# Patient Record
Sex: MALE | URBAN - METROPOLITAN AREA
[De-identification: names, ages, dates, MRNs, and addresses within clinical notes are randomized per-mention and may not be internally consistent; named-entity substitution may affect disease eponyms.]

---

## 2018-06-07 ENCOUNTER — HOSPITAL ENCOUNTER (OUTPATIENT)
Age: 78
Discharge: HOME | End: 2018-06-09

## 2018-06-07 LAB — PROCALCITONIN: 0.51 NG/ML (ref 0–0.08)

## 2018-06-07 PROCEDURE — 84145 PROCALCITONIN (PCT): CPT

## 2023-08-23 LAB
ALANINE AMINOTRANSFERASE (SGPT) (U/L) IN SER/PLAS: 17 U/L (ref 10–52)
ALBUMIN (G/DL) IN SER/PLAS: 4.1 G/DL (ref 3.4–5)
ALKALINE PHOSPHATASE (U/L) IN SER/PLAS: 89 U/L (ref 33–136)
ANION GAP IN SER/PLAS: 11 MMOL/L (ref 10–20)
ASPARTATE AMINOTRANSFERASE (SGOT) (U/L) IN SER/PLAS: 17 U/L (ref 9–39)
BILIRUBIN TOTAL (MG/DL) IN SER/PLAS: 0.4 MG/DL (ref 0–1.2)
CALCIUM (MG/DL) IN SER/PLAS: 9.6 MG/DL (ref 8.6–10.3)
CARBON DIOXIDE, TOTAL (MMOL/L) IN SER/PLAS: 33 MMOL/L (ref 21–32)
CHLORIDE (MMOL/L) IN SER/PLAS: 97 MMOL/L (ref 98–107)
CHOLESTEROL (MG/DL) IN SER/PLAS: 175 MG/DL (ref 0–199)
CHOLESTEROL IN HDL (MG/DL) IN SER/PLAS: 36.2 MG/DL
CHOLESTEROL/HDL RATIO: 4.8
CREATININE (MG/DL) IN SER/PLAS: 1.22 MG/DL (ref 0.5–1.3)
GFR MALE: 59 ML/MIN/1.73M2
GLUCOSE (MG/DL) IN SER/PLAS: 125 MG/DL (ref 74–99)
LDL: 81 MG/DL (ref 0–99)
NON HDL CHOLESTEROL: 139 MG/DL
POTASSIUM (MMOL/L) IN SER/PLAS: 5.2 MMOL/L (ref 3.5–5.3)
PROTEIN TOTAL: 7 G/DL (ref 6.4–8.2)
SODIUM (MMOL/L) IN SER/PLAS: 136 MMOL/L (ref 136–145)
TRIGLYCERIDE (MG/DL) IN SER/PLAS: 290 MG/DL (ref 0–149)
UREA NITROGEN (MG/DL) IN SER/PLAS: 27 MG/DL (ref 6–23)
VLDL: 58 MG/DL (ref 0–40)

## 2023-11-01 ENCOUNTER — HOSPITAL ENCOUNTER (EMERGENCY)
Facility: HOSPITAL | Age: 83
Discharge: HOME | End: 2023-11-01
Payer: MEDICARE

## 2023-11-01 VITALS
HEIGHT: 70 IN | RESPIRATION RATE: 18 BRPM | BODY MASS INDEX: 29.35 KG/M2 | WEIGHT: 205 LBS | SYSTOLIC BLOOD PRESSURE: 205 MMHG | OXYGEN SATURATION: 96 % | HEART RATE: 78 BPM | TEMPERATURE: 97 F | DIASTOLIC BLOOD PRESSURE: 91 MMHG

## 2023-11-01 PROCEDURE — 99281 EMR DPT VST MAYX REQ PHY/QHP: CPT

## 2023-11-01 PROCEDURE — 4500999001 HC ED NO CHARGE

## 2023-11-01 ASSESSMENT — PAIN - FUNCTIONAL ASSESSMENT: PAIN_FUNCTIONAL_ASSESSMENT: 0-10

## 2023-11-01 ASSESSMENT — PAIN SCALES - GENERAL: PAINLEVEL_OUTOF10: 0 - NO PAIN

## 2023-11-16 ENCOUNTER — EVALUATION (OUTPATIENT)
Dept: OCCUPATIONAL THERAPY | Facility: HOSPITAL | Age: 83
End: 2023-11-16
Payer: MEDICARE

## 2023-11-16 DIAGNOSIS — R27.8 ABNORMAL COORDINATION: ICD-10-CM

## 2023-11-16 DIAGNOSIS — R29.898 RIGHT HAND WEAKNESS: Primary | ICD-10-CM

## 2023-11-16 DIAGNOSIS — R29.818 NEUROLOGIC ABNORMALITY: ICD-10-CM

## 2023-11-16 DIAGNOSIS — M21.331 RIGHT WRIST DROP: ICD-10-CM

## 2023-11-16 PROCEDURE — 97110 THERAPEUTIC EXERCISES: CPT | Mod: GO | Performed by: OCCUPATIONAL THERAPIST

## 2023-11-16 PROCEDURE — 97165 OT EVAL LOW COMPLEX 30 MIN: CPT | Mod: GO | Performed by: OCCUPATIONAL THERAPIST

## 2023-11-16 ASSESSMENT — ENCOUNTER SYMPTOMS
DEPRESSION: 0
OCCASIONAL FEELINGS OF UNSTEADINESS: 0
LOSS OF SENSATION IN FEET: 0

## 2023-11-16 ASSESSMENT — PAIN - FUNCTIONAL ASSESSMENT: PAIN_FUNCTIONAL_ASSESSMENT: 0-10

## 2023-11-16 ASSESSMENT — PATIENT HEALTH QUESTIONNAIRE - PHQ9
1. LITTLE INTEREST OR PLEASURE IN DOING THINGS: NOT AT ALL
2. FEELING DOWN, DEPRESSED OR HOPELESS: NOT AT ALL
SUM OF ALL RESPONSES TO PHQ9 QUESTIONS 1 AND 2: 0

## 2023-11-16 ASSESSMENT — PAIN SCALES - GENERAL: PAINLEVEL_OUTOF10: 0 - NO PAIN

## 2023-11-16 NOTE — PROGRESS NOTES
Occupational Therapy    Evaluation/Treatment    Patient Name: David Ahuja  MRN: [id number]  : 1940  Today's Date: 23     Time Calculation  Start Time: 1010  Stop Time: 1100  Time Calculation (min): 50 min    Subjective   Current Problem:  1. Right hand weakness  Referral to Occupational Therapy    Follow Up In Occupational Therapy      2. Right wrist drop  Referral to Occupational Therapy    Follow Up In Occupational Therapy      3. Neurologic abnormality  Referral to Occupational Therapy    Follow Up In Occupational Therapy      4. Abnormal coordination  Follow Up In Occupational Therapy        General:   OT Received On: 23  General  Reason for Referral: R hand weakness  Referred By: Dr. Henley  Pt reporting 2 weeks, (10/31/23) he woke up in the morning and was unable to use R hand. Pt went to physician regarding hand impairments. Pt vague with information provided. Pt stating his hand is not doing what he wants it to do.  Pt reporting he has head CT scheduled for next week.   R hand dominant/R impaired  Precautions:  STEADI Fall Risk Score (The score of 4 or more indicates an increased risk of falling): 1     Pain:  Pain Assessment  Pain Assessment: 0-10  Pain Score: 0 - No pain    Objective      Home Living:    Pt lives with spouse. Pt lives in 2 level house with 12 steps upstairs, first floor set-up. 5 step entry.  Prior Function:    IND with ADL tasks.   IADL History:   IND with IADL tasks.   ADL:   IND with ADL tasks.     Vision:  Intact. Pt wearing glasses.  Sensation:   Notable difference in temperature between L and R.   LT intact.  Sharp/dull Impaired - however questionable ability to follow commands during assessment.  Strength:   R shoulder flexion: 4/5  L shoulder flexion: 4+/5  R elbow ext/flexion: 5/5  L elbow ext/flexion: 5/5  R wrist ext: 4/5  L wrist ext: 4/5  R wrist flex: 5/5  L wrist flex: 5/5    :  Average taken from best 3 measurements.   Trials Average   RUE 55, 55,  65, 59, 58 61   LUE 81, 79, 80, 75, 74 80   RUE 76% of LUE.     Coordination:     Nine Hole Peg Test:  RUE 54 seconds   LUE 34 seconds     Box and Blocks  Right 35   Left 37     Hand Function:   Perception: 3/3   Outcome Measures:   Quickdash Scores: 38.64% impaired    Therapy/Activity:   Exercises: Reps:   Pink theraputty 1x5 comp flexion, abduction, and pinch.    5 digit position 1x5 with MOD VC and hand over hand assistance.            HEP provided on 11/16/2023 Education provided to pt regarding theraputty and digit ROM exercises. Pt instructed to not push pain. Handouts provided to pt.   Activities:                    Modalities:                    Manual:                    Functional review:     Completed on: 11/16/2023 , box and blocks, nine hole peg test     OP EDUCATION:  Education  Individual(s) Educated: Patient  Education Provided: Diagnosis & Precautions, Symptom management, Fall precautons, POC discussed and agreed upon, Risk and benefits of OT discussed with patient or other  Home Program: [facility], Handout issued  Risk and Benefits Discussed with Patient/Caregiver/Other: yes  Patient/Caregiver Demonstrated Understanding: yes  Plan of Care Discussed and Agreed Upon: yes  Patient Response to Education: Patient/Caregiver Verbalized Understanding of Information    Assessment:   Pt is a 84 y/o M who presents to this facility with performance deficits in ROM, FMC, and strength limiting ability to complete ADL and IADL tasks. Pt  provided with HEP including theraputty and ROM. Handouts provided to pt. Pt demonstrated understanding.     OT Assessment Results: Decreased ADL status, Decreased upper extremity range of motion, Decreased upper extremity strength, Decreased IADLs  Plan:     Duration: 12 weeks  Rehab Potential: Good  Plan of Care Agreement: Patient    Occupational therapy intervention plan to include education/instruction, manual therapy, neuromuscular re-education, orthotic fitting/training,  self-care/home management, therapeutic exercises, therapeutic activities, and home program.      Goals:  Active       OT Goals       LTG - Patient will indicate/ demonstrate the ability to resume all preinjury ADLs and IADLs without significant limits secondary to decreased ROM, decreased strength and/or pain as indicated by Quickdash score of less than 10%.        Start:  11/16/23    Expected End:  02/08/24            Develop and issue HEP to help maximize ROM, strength and tolerance to help maximize return to all pre-onset activities.        Start:  11/16/23    Expected End:  02/08/24            Pt will demonstrate increased FMC in R hand as indicated by increased 9HPT score 2-5 seconds as compared to L hand.        Start:  11/16/23    Expected End:  02/08/24            Pt will demonstrate increased  strength as appropriate with the R  to be greater than or equal to 80% of the L to help patient resume ADLs and IADLs.        Start:  11/16/23    Expected End:  02/08/24

## 2023-11-20 ENCOUNTER — TREATMENT (OUTPATIENT)
Dept: OCCUPATIONAL THERAPY | Facility: HOSPITAL | Age: 83
End: 2023-11-20
Payer: MEDICARE

## 2023-11-20 DIAGNOSIS — M21.331 RIGHT WRIST DROP: ICD-10-CM

## 2023-11-20 DIAGNOSIS — R29.818 NEUROLOGIC ABNORMALITY: ICD-10-CM

## 2023-11-20 DIAGNOSIS — R27.8 ABNORMAL COORDINATION: ICD-10-CM

## 2023-11-20 DIAGNOSIS — R29.898 RIGHT HAND WEAKNESS: ICD-10-CM

## 2023-11-20 PROCEDURE — 97110 THERAPEUTIC EXERCISES: CPT | Mod: GO | Performed by: OCCUPATIONAL THERAPIST

## 2023-11-20 PROCEDURE — 97530 THERAPEUTIC ACTIVITIES: CPT | Mod: GO | Performed by: OCCUPATIONAL THERAPIST

## 2023-11-20 ASSESSMENT — PAIN - FUNCTIONAL ASSESSMENT: PAIN_FUNCTIONAL_ASSESSMENT: 0-10

## 2023-11-20 ASSESSMENT — PAIN SCALES - GENERAL: PAINLEVEL_OUTOF10: 0 - NO PAIN

## 2023-11-20 NOTE — PROGRESS NOTES
Occupational Therapy    OT Treatment    Patient Name: David Ahuja  MRN: [id number]  Today's Date: 11/20/2023     Time Calculation  Start Time: 0845  Stop Time: 0930  Time Calculation (min): 45 min    Visit Number: 2    Subjective   General:   Pt reporting holding coffee cup in the morning is easier, did not spill. However tasks of brushing hair remains difficult. Pt has head CT scheduled tomorrow.     Pain:  Pain Assessment  Pain Assessment: 0-10  Pain Score: 0 - No pain    Objective       Therapy/Activity:   Exercises: Reps:   Pink theraputty     5 digit position 1x5 with MIN VC and hand over hand assistance.    Hand gripper 20# 3x10, focusing on flexion and extension        HEP provided on 11/16/2023    Activities:    Peg board with design Pt completed set 5-8, pt completed half of design picking one peg up at a time, other half completed picking up 2 pegs at a time to challenge FMC. Pt frequently dropping second peg. Removal with tweezers, dropping x5.   Weighted pinch clips 1x6, 1#           Modalities:                    Manual:                    Functional review:     Completed on: 11/16/2023        OP EDUCATION:  Education  Individual(s) Educated: Patient  Education Provided: Diagnosis & Precautions  Home Program: [facility], Strengthening, Fine motor tasks  Diagnosis and Precautions: See IE  Patient Response to Education: Patient/Caregiver Verbalized Understanding of Information    Assessment:   Pt participated in therapeutic exercises and activities as needed to address FMC and strengthening. Pt continues to have difficulty with RSF.  Pt reporting increased fatigue in hand when intermediate throughout peg board with design. Upgraded task to include picking up 2 objects at a time. Strengthening with weighted pinch clips and hand gripper completed with MIN VC. Focused on RSF and RLF during tasks. Pt reporting overall fatigue, but no pain in hand after OT treatment.     Plan:   Pt to continue addressing FMC and strength  as needed to increase IND with ADL and IADL tasks.     Goals:  Active       OT Goals       LTG - Patient will indicate/ demonstrate the ability to resume all preinjury ADLs and IADLs without significant limits secondary to decreased ROM, decreased strength and/or pain as indicated by Quickdash score of less than 10%.        Start:  11/16/23    Expected End:  02/08/24            Develop and issue HEP to help maximize ROM, strength and tolerance to help maximize return to all pre-onset activities.        Start:  11/16/23    Expected End:  02/08/24            Pt will demonstrate increased FMC in R hand as indicated by increased 9HPT score 2-5 seconds as compared to L hand.        Start:  11/16/23    Expected End:  02/08/24            Pt will demonstrate increased  strength as appropriate with the R  to be greater than or equal to 80% of the L to help patient resume ADLs and IADLs.        Start:  11/16/23    Expected End:  02/08/24

## 2023-11-21 ENCOUNTER — HOSPITAL ENCOUNTER (OUTPATIENT)
Dept: RADIOLOGY | Facility: HOSPITAL | Age: 83
Discharge: HOME | End: 2023-11-21
Payer: MEDICARE

## 2023-11-21 DIAGNOSIS — R29.898 OTHER SYMPTOMS AND SIGNS INVOLVING THE MUSCULOSKELETAL SYSTEM: ICD-10-CM

## 2023-11-21 DIAGNOSIS — R29.818 OTHER SYMPTOMS AND SIGNS INVOLVING THE NERVOUS SYSTEM: ICD-10-CM

## 2023-11-21 DIAGNOSIS — M21.331 WRIST DROP, RIGHT WRIST: ICD-10-CM

## 2023-11-21 PROCEDURE — 70450 CT HEAD/BRAIN W/O DYE: CPT

## 2023-11-21 PROCEDURE — 70450 CT HEAD/BRAIN W/O DYE: CPT | Performed by: RADIOLOGY

## 2023-11-22 ENCOUNTER — TREATMENT (OUTPATIENT)
Dept: OCCUPATIONAL THERAPY | Facility: HOSPITAL | Age: 83
End: 2023-11-22
Payer: MEDICARE

## 2023-11-22 DIAGNOSIS — R29.818 NEUROLOGIC ABNORMALITY: Primary | ICD-10-CM

## 2023-11-22 DIAGNOSIS — R27.8 ABNORMAL COORDINATION: ICD-10-CM

## 2023-11-22 DIAGNOSIS — R29.898 RIGHT HAND WEAKNESS: ICD-10-CM

## 2023-11-22 DIAGNOSIS — M21.331 RIGHT WRIST DROP: ICD-10-CM

## 2023-11-22 PROCEDURE — 97530 THERAPEUTIC ACTIVITIES: CPT | Mod: GO,CO

## 2023-11-22 PROCEDURE — 97110 THERAPEUTIC EXERCISES: CPT | Mod: GO,CO

## 2023-11-22 ASSESSMENT — PAIN SCALES - GENERAL: PAINLEVEL_OUTOF10: 0 - NO PAIN

## 2023-11-22 ASSESSMENT — PAIN - FUNCTIONAL ASSESSMENT: PAIN_FUNCTIONAL_ASSESSMENT: 0-10

## 2023-11-22 NOTE — PROGRESS NOTES
Occupational Therapy    OT Treatment    Patient Name: David Ahuja  MRN: [id number]  Today's Date: 11/22/2023     Time Calculation  Start Time: 1600  Stop Time: 1700  Time Calculation (min): 60 min    Visit Number: 3    Subjective   General:   Pt reports after he completes his HEP he can tell that his entire hand has been worked out. Pt reports he has felt sore on the ulnar aspect of his forearm. Pt stated he is having trouble with holding utensils, zipping up his coat, and combing his hair due to the weakness in his RRF and RSF.      Pain:  Pain Assessment  Pain Assessment: 0-10  Pain Score: 0 - No pain    Objective       Therapy/Activity:   Exercises: Reps:   Pink theraputty     5 digit position 1x5 with MIN VC    Hand strengthening Digiflex #3 grasp 1x10 with 5 second holds   MH with passive stretch 8 min digit flexion/extension   HEP provided on 11/16/2023    Activities:    DML Pegboard Set 3-27  Set 2-16a  *pinch for placement and tweezers for removal   Weighted pinch clips    Velcro Board 1x31 RSF flexion for removal RSF extension for placement.   *pt required MOD VC for placement       Modalities:                    Manual:                    Functional review:     Completed on: 11/16/2023        OP EDUCATION:  Education  Individual(s) Educated: Patient  Education Provided: Diagnosis & Precautions  Home Program: [facility], Strengthening, Fine motor tasks  Diagnosis and Precautions: See IE  Patient Response to Education: Patient/Caregiver Verbalized Understanding of Information    Assessment:   Pt participated in therapeutic exercises and activities as needed to FMC and strengthening. Pt continues to have difficulty with strength and ROM of RSF. Pt completed DML pegboard with increase time for placement due to frequent drops. Pt required to use tweezers for removal and displayed decreased coordination. Pt completed velcro board to isolate RSF flexion and extension, pt required MOD VC to keep RSF extended. Pt  tolerated 60 minute OT tx session, pt reported no pain only moderate muscle fatigue after completing activities throughout his entire arm.      Plan:   Pt to continue addressing FMC and strength as needed to increase IND with ADL and IADL tasks. Plan to engage patient in more in-hand manipulation tasks.      Goals:  Active       OT Goals       LTG - Patient will indicate/ demonstrate the ability to resume all preinjury ADLs and IADLs without significant limits secondary to decreased ROM, decreased strength and/or pain as indicated by Quickdash score of less than 10%.        Start:  11/16/23    Expected End:  02/08/24            Develop and issue HEP to help maximize ROM, strength and tolerance to help maximize return to all pre-onset activities.        Start:  11/16/23    Expected End:  02/08/24            Pt will demonstrate increased FMC in R hand as indicated by increased 9HPT score 2-5 seconds as compared to L hand.        Start:  11/16/23    Expected End:  02/08/24            Pt will demonstrate increased  strength as appropriate with the R  to be greater than or equal to 80% of the L to help patient resume ADLs and IADLs.        Start:  11/16/23    Expected End:  02/08/24

## 2023-11-27 ENCOUNTER — TREATMENT (OUTPATIENT)
Dept: OCCUPATIONAL THERAPY | Facility: HOSPITAL | Age: 83
End: 2023-11-27
Payer: MEDICARE

## 2023-11-27 DIAGNOSIS — M21.331 RIGHT WRIST DROP: ICD-10-CM

## 2023-11-27 DIAGNOSIS — R29.818 NEUROLOGIC ABNORMALITY: Primary | ICD-10-CM

## 2023-11-27 DIAGNOSIS — R27.8 ABNORMAL COORDINATION: ICD-10-CM

## 2023-11-27 DIAGNOSIS — R29.898 RIGHT HAND WEAKNESS: ICD-10-CM

## 2023-11-27 PROCEDURE — 97530 THERAPEUTIC ACTIVITIES: CPT | Mod: GO,CO

## 2023-11-27 ASSESSMENT — PAIN SCALES - GENERAL: PAINLEVEL_OUTOF10: 0 - NO PAIN

## 2023-11-27 ASSESSMENT — PAIN - FUNCTIONAL ASSESSMENT: PAIN_FUNCTIONAL_ASSESSMENT: 0-10

## 2023-11-27 NOTE — PROGRESS NOTES
Occupational Therapy    OT Treatment    Patient Name: David Ahuja  MRN: [id number]  Today's Date: 11/27/2023     Time Calculation  Start Time: 1005  Stop Time: 1100  Time Calculation (min): 55 min    Visit Number: 4    Subjective   General:   Pt reported he was slightly sore after last tx session but it did not last for more than a couple hours. Pt reported he was able to eat his dinner and actually hold the utensil with R hand.     Pain:  Pain Assessment  Pain Assessment: 0-10  Pain Score: 0 - No pain    Objective       Therapy/Activity:   Exercises: Reps:   Pink theraputty     5 digit position 1x5 with MIN VC    Hand strengthening Digiflex #3 grasp 1x10 with 5 second holds   MH with passive stretch 8 min digit flexion/extension   HEP provided on 11/16/2023    Activities:    DML Pegboard Set 3-13  Set 3-20  *pinch for placement and tweezers for removal   Weighted pinch clips 1# 1x11 placement and removal   Velcro Board    PVC matching  Pictures 21 and 18   In hand-manipulation  martin translate to palm and translate martin back to fingertips    Modalities:                Manual:                    Functional review:     Completed on: 11/16/2023        OP EDUCATION:  Education  Individual(s) Educated: Patient  Education Provided: Diagnosis & Precautions  Home Program: [facility], Strengthening, Fine motor tasks  Diagnosis and Precautions: See IE  Patient Response to Education: Patient/Caregiver Verbalized Understanding of Information    Assessment:   Pt participated in therapeutic exercises and activities as needed to address FMC and strengthening. Pt continues to have difficulty with strength and ROM of RSF. Pt introduced to PVC matching pictures and tolerated okay, pt required increased time due to slight difficulty with visual scanning left to right. Pt tolerated DML pegboard okay with the introduction of multiple colors and designs. Pt displayed increased difficulty with in hand manipulation task and was unable  to translate objects to back to fingertips. Pt reported no increase in pain only moderate muscle fatigue throughout the ulnar aspect of his forearm after 55 minute OT tx session.     Plan:   Pt to continue addressing FMC and strength as needed to increase IND with ADL and IADL tasks.    The supervising therapist was present for the entire session and made all clinical decisions.     Goals:  Active       OT Goals       LTG - Patient will indicate/ demonstrate the ability to resume all preinjury ADLs and IADLs without significant limits secondary to decreased ROM, decreased strength and/or pain as indicated by Quickdash score of less than 10%.        Start:  11/16/23    Expected End:  02/08/24            Develop and issue HEP to help maximize ROM, strength and tolerance to help maximize return to all pre-onset activities.        Start:  11/16/23    Expected End:  02/08/24            Pt will demonstrate increased FMC in R hand as indicated by increased 9HPT score 2-5 seconds as compared to L hand.        Start:  11/16/23    Expected End:  02/08/24            Pt will demonstrate increased  strength as appropriate with the R  to be greater than or equal to 80% of the L to help patient resume ADLs and IADLs.        Start:  11/16/23    Expected End:  02/08/24

## 2023-11-29 ENCOUNTER — TREATMENT (OUTPATIENT)
Dept: OCCUPATIONAL THERAPY | Facility: HOSPITAL | Age: 83
End: 2023-11-29
Payer: MEDICARE

## 2023-11-29 DIAGNOSIS — M21.331 RIGHT WRIST DROP: ICD-10-CM

## 2023-11-29 DIAGNOSIS — R27.8 ABNORMAL COORDINATION: ICD-10-CM

## 2023-11-29 DIAGNOSIS — R29.898 RIGHT HAND WEAKNESS: Primary | ICD-10-CM

## 2023-11-29 DIAGNOSIS — R29.818 NEUROLOGIC ABNORMALITY: ICD-10-CM

## 2023-11-29 PROCEDURE — 97530 THERAPEUTIC ACTIVITIES: CPT | Mod: GO,CO

## 2023-11-29 PROCEDURE — 97110 THERAPEUTIC EXERCISES: CPT | Mod: GO,CO

## 2023-11-29 ASSESSMENT — PAIN - FUNCTIONAL ASSESSMENT: PAIN_FUNCTIONAL_ASSESSMENT: 0-10

## 2023-11-29 ASSESSMENT — PAIN SCALES - GENERAL: PAINLEVEL_OUTOF10: 0 - NO PAIN

## 2023-11-29 NOTE — PROGRESS NOTES
Occupational Therapy    OT Treatment    Patient Name: David Ahuja  MRN: [id number]  Today's Date: 11/29/2023     Time Calculation  Start Time: 1030  Stop Time: 1115  Time Calculation (min): 45 min    Visit Number: 5    Subjective   General:   Pt reports his hand has felt more stiff in the morning. Pt reports he completed his putty exercises at home and struggled to use his RSF.      Pain:  Pain Assessment  Pain Assessment: 0-10  Pain Score: 0 - No pain    Objective       Therapy/Activity:   Exercises: Reps:   Pink theraputty     5 digit position 1x5 with MOD VC    Hand strengthening Hand gripper 25# 2x10 focusing on digit flexion and extension   MH with passive stretch 8 min digit flexion/extension   HEP provided on 11/16/2023    Activities:    DML Pegboard    Weighted pinch clips    Velcro Board RSF flexion for removal, RSF extension for placement   PVC matching     In hand-manipulation  martin translate to palm and translate martin back to fingertips    Modalities:                Manual:                    Functional review:     Completed on: 11/16/2023        OP EDUCATION:  Education  Individual(s) Educated: Patient  Education Provided: Diagnosis & Precautions  Home Program: [facility], Strengthening, Fine motor tasks  Diagnosis and Precautions: See IE  Patient Response to Education: Patient/Caregiver Verbalized Understanding of Information    Assessment:   Pt participated in therapeutic exercises and activities as needed to address FMC and strengthening. Pt continues to have difficulty with strength and ROM of RSF. Pt continued to have difficulty with velcro board activity but demonstrated increased ROM with RSF extension. Pt continued to display increased difficulty with in hand manipulation task and was unable to translate objects to back to fingertips. Pt required MOD VC and hands on assistance to complete activities. Pt reported no increase in pain only moderate muscle fatigue throughout the ulnar aspect of  his forearm after 45 minute OT tx session.     Plan:   Pt to continue addressing FMC and strength as needed to increase IND with ADL and IADL task. Plan to take  measurements next session.        The supervising therapist was present for the entire session and made all clinical decisions.  Goals:  Active       OT Goals       LTG - Patient will indicate/ demonstrate the ability to resume all preinjury ADLs and IADLs without significant limits secondary to decreased ROM, decreased strength and/or pain as indicated by Quickdash score of less than 10%.        Start:  11/16/23    Expected End:  02/08/24            Develop and issue HEP to help maximize ROM, strength and tolerance to help maximize return to all pre-onset activities.        Start:  11/16/23    Expected End:  02/08/24            Pt will demonstrate increased FMC in R hand as indicated by increased 9HPT score 2-5 seconds as compared to L hand.        Start:  11/16/23    Expected End:  02/08/24            Pt will demonstrate increased  strength as appropriate with the R  to be greater than or equal to 80% of the L to help patient resume ADLs and IADLs.        Start:  11/16/23    Expected End:  02/08/24

## 2023-12-04 ENCOUNTER — TREATMENT (OUTPATIENT)
Dept: OCCUPATIONAL THERAPY | Facility: HOSPITAL | Age: 83
End: 2023-12-04
Payer: MEDICARE

## 2023-12-04 DIAGNOSIS — M21.331 RIGHT WRIST DROP: ICD-10-CM

## 2023-12-04 DIAGNOSIS — R29.898 RIGHT HAND WEAKNESS: ICD-10-CM

## 2023-12-04 DIAGNOSIS — R27.8 ABNORMAL COORDINATION: ICD-10-CM

## 2023-12-04 DIAGNOSIS — R29.818 NEUROLOGIC ABNORMALITY: ICD-10-CM

## 2023-12-04 PROCEDURE — 97530 THERAPEUTIC ACTIVITIES: CPT | Mod: GO | Performed by: OCCUPATIONAL THERAPIST

## 2023-12-04 ASSESSMENT — PAIN SCALES - GENERAL: PAINLEVEL_OUTOF10: 0 - NO PAIN

## 2023-12-04 ASSESSMENT — PAIN - FUNCTIONAL ASSESSMENT: PAIN_FUNCTIONAL_ASSESSMENT: 0-10

## 2023-12-04 NOTE — PROGRESS NOTES
Occupational Therapy    OT Treatment    Patient Name: David Ahuja  MRN: [id number]  Today's Date: 12/4/2023     Time Calculation  Start Time: 1025  Stop Time: 1120  Time Calculation (min): 55 min      Subjective   General:   Pt reporting he went to the doctor on Friday.  Pt reporting Head CT (+) for CVA.     Pain:  Pain Assessment  Pain Assessment: 0-10  Pain Score: 0 - No pain    Objective       Therapy/Activity:   Exercises: Reps:   Pink theraputty     5 digit position    Hand strengthening    MH with passive stretch    HEP provided on 11/16/2023    Activities:    Pegboard 1x15 alternating digits for placing pegs, removing with hook grasp 4th and 5th digits.    Weighted pinch clips    Velcro Board Opposition thumb and RSF/RRF   PVC matching     In hand-manipulation    Valpar 1x5 with RUE using thumb and MF/SF opposition   Modalities:                Manual:                    Functional review:     Completed on: 12/4/2023 , 9HPT     Measurements:  :  Average taken from best 3 measurements.   Trials Average   RUE 50, 53, 48 50   LUE 81, 84, 80 82     Nine Hole Peg Test:  RUE 1 minute   LUE 34 seconds     OP EDUCATION:  Education  Individual(s) Educated: Patient  Education Provided: Diagnosis & Precautions  Home Program: [facility], Fine motor tasks, Strengthening  Diagnosis and Precautions: See IE  Patient Response to Education: Patient/Caregiver Verbalized Understanding of Information  Education Comment: Education provided to pt regarding continuing to use UE during ADL and IADL tasks at home.    Assessment:   Occupational therapy re-assessment completed this date. Functional measurements completed. Pt showing progress as indicated by Quickdash scores improvement, however continues to have difficulty with FMC and strengthening. Pt tolerated new therapeutic activities to address higher level FMC. Valpar task completed with MIN VC. FMC tasks focused on opposition with thumb and MF. Pt tolerated OT treatment.      Plan:   Pt to continue addressing FMC and strengthening as needed to progress with ADL and IADL tasks.      Goals:  Active       OT Goals       LTG - Patient will indicate/ demonstrate the ability to resume all preinjury ADLs and IADLs without significant limits secondary to decreased ROM, decreased strength and/or pain as indicated by Quickdash score of less than 10%.  (Progressing)       Start:  11/16/23    Expected End:  02/08/24            Develop and issue HEP to help maximize ROM, strength and tolerance to help maximize return to all pre-onset activities.  (Progressing)       Start:  11/16/23    Expected End:  02/08/24            Pt will demonstrate increased FMC in R hand as indicated by increased 9HPT score 2-5 seconds as compared to L hand.  (Progressing)       Start:  11/16/23    Expected End:  02/08/24            Pt will demonstrate increased  strength as appropriate with the R  to be greater than or equal to 80% of the L to help patient resume ADLs and IADLs.  (Progressing)       Start:  11/16/23    Expected End:  02/08/24

## 2023-12-06 ENCOUNTER — TREATMENT (OUTPATIENT)
Dept: OCCUPATIONAL THERAPY | Facility: HOSPITAL | Age: 83
End: 2023-12-06
Payer: MEDICARE

## 2023-12-06 DIAGNOSIS — R29.818 NEUROLOGIC ABNORMALITY: Primary | ICD-10-CM

## 2023-12-06 DIAGNOSIS — R29.898 RIGHT HAND WEAKNESS: ICD-10-CM

## 2023-12-06 DIAGNOSIS — R27.8 ABNORMAL COORDINATION: ICD-10-CM

## 2023-12-06 DIAGNOSIS — M21.331 RIGHT WRIST DROP: ICD-10-CM

## 2023-12-06 PROCEDURE — 97530 THERAPEUTIC ACTIVITIES: CPT | Mod: GO,CO

## 2023-12-06 ASSESSMENT — PAIN SCALES - GENERAL: PAINLEVEL_OUTOF10: 0 - NO PAIN

## 2023-12-06 ASSESSMENT — PAIN - FUNCTIONAL ASSESSMENT: PAIN_FUNCTIONAL_ASSESSMENT: 0-10

## 2023-12-06 NOTE — PROGRESS NOTES
Occupational Therapy    OT Treatment    Patient Name: David Ahuja  MRN: [id number]  Today's Date: 12/6/2023     Time Calculation  Start Time: 1030  Stop Time: 1120  Time Calculation (min): 50 min    Visit Number: 7    Subjective   General:   Pt reports he had troubles using a staple gun due to decreased strength. Pt reports he has been able to use utensils and brush his teeth with his R hand with less difficulty.     Pain:  Pain Assessment  Pain Assessment: 0-10  Pain Score: 0 - No pain    Objective       Therapy/Activity:   Exercises: Reps:   Pink theraputty     5 digit position    Hand strengthening    MH with passive stretch 8 minutes digit extension and flexion   HEP provided on 11/16/2023    Activities:    Pegboard 1x20 oppositional RRF pinch for placing pegs, removing with RSF oppositional pinch   Weighted pinch clips    Velcro Board    Purdue Pegboard 1x5 assemblies RRF oppositional pinch   In hand-manipulation    Valpar    Modalities:                Manual:                    Functional review:     Completed on: 12/4/2023        OP EDUCATION:  Education  Patient Response to Education: Patient/Caregiver Verbalized Understanding of Information  Education Comment: Education provided to pt regarding continuing to use UE during ADL and IADL tasks at home.    Assessment:   Pt participated in therapeutic exercises and activities as needed to address FMC and strengthening. Pt continues to have difficulty with strength and ROM of RSF. Pt required MOD VC and hands on assistance to complete activities. Pt introduced to purdue pegboard and had moderate difficulty due to inability to properly  smaller objects. Pt reported no increase in pain only moderate muscle fatigue throughout the ulnar aspect of his forearm after 45 minute OT tx session.  OT Assessment Results: Decreased ADL status, Decreased upper extremity range of motion, Decreased upper extremity strength, Decreased fine motor control, Decreased  endurance  Plan:   Pt to continue addressing FMC and strengthening as needed to progress with ADL and IADL tasks.       The supervising therapist was present for the entire session and made all clinical decisions.  Goals:  Active       OT Goals       LTG - Patient will indicate/ demonstrate the ability to resume all preinjury ADLs and IADLs without significant limits secondary to decreased ROM, decreased strength and/or pain as indicated by Quickdash score of less than 10%.  (Progressing)       Start:  11/16/23    Expected End:  02/08/24            Develop and issue HEP to help maximize ROM, strength and tolerance to help maximize return to all pre-onset activities.  (Progressing)       Start:  11/16/23    Expected End:  02/08/24            Pt will demonstrate increased FMC in R hand as indicated by increased 9HPT score 2-5 seconds as compared to L hand.  (Progressing)       Start:  11/16/23    Expected End:  02/08/24            Pt will demonstrate increased  strength as appropriate with the R  to be greater than or equal to 80% of the L to help patient resume ADLs and IADLs.  (Progressing)       Start:  11/16/23    Expected End:  02/08/24

## 2023-12-11 ENCOUNTER — TREATMENT (OUTPATIENT)
Dept: OCCUPATIONAL THERAPY | Facility: HOSPITAL | Age: 83
End: 2023-12-11
Payer: MEDICARE

## 2023-12-11 DIAGNOSIS — R27.8 ABNORMAL COORDINATION: ICD-10-CM

## 2023-12-11 DIAGNOSIS — R29.818 NEUROLOGIC ABNORMALITY: ICD-10-CM

## 2023-12-11 DIAGNOSIS — M21.331 RIGHT WRIST DROP: ICD-10-CM

## 2023-12-11 DIAGNOSIS — R29.898 RIGHT HAND WEAKNESS: ICD-10-CM

## 2023-12-11 PROCEDURE — 97110 THERAPEUTIC EXERCISES: CPT | Mod: GO,CO

## 2023-12-11 PROCEDURE — 97530 THERAPEUTIC ACTIVITIES: CPT | Mod: GO,CO

## 2023-12-11 ASSESSMENT — PAIN - FUNCTIONAL ASSESSMENT: PAIN_FUNCTIONAL_ASSESSMENT: 0-10

## 2023-12-11 ASSESSMENT — PAIN SCALES - GENERAL: PAINLEVEL_OUTOF10: 0 - NO PAIN

## 2023-12-11 NOTE — PROGRESS NOTES
Occupational Therapy    OT Treatment    Patient Name: David Ahuja  MRN: [id number]  Today's Date: 12/11/2023  Visit 8  Time Calculation  Start Time: 1030  Stop Time: 1115  Time Calculation (min): 45 min      Assessment:  Pt expressed better success than last time with Purdue Peg board and felt more encourage about outcome.  OT Assessment Results: Decreased ADL status, Decreased upper extremity range of motion, Decreased upper extremity strength, Decreased fine motor control, Decreased endurance    Plan:  Pt to continue with FM coordination and in hand manipulation to progress ADL and IADL performances.     Subjective   Pt reports hand is cold today from weather today and his hand doesn't work as well when its cold.    Pain:  Pain Assessment  Pain Assessment: 0-10  Pain Score: 0 - No pain    Objective    Exercises: Reps:   Pink theraputty     5 digit position 1 x 5 reps   Hand strengthening    MH with passive stretch 8 minutes digit extension and flexion   HEP provided on 11/16/2023    Activities:    Pegboard    Weighted pinch clips    Velcro Board    Purdue Pegboard 1 x 9 pegs, washers and nuts on and off RIF 3 fumbles   In hand-manipulation Ball manipulation CW 1 x 5 min   Valpar    Modalities:                Manual:                    Functional review:     Completed on: 12/4/2023            OP EDUCATION:  Education  Patient Response to Education: Patient/Caregiver Verbalized Understanding of Information  Education Comment: Education provided to pt regarding continuing to use UE during ADL and IADL tasks at home.    Goals:  Active       OT Goals       LTG - Patient will indicate/ demonstrate the ability to resume all preinjury ADLs and IADLs without significant limits secondary to decreased ROM, decreased strength and/or pain as indicated by Quickdash score of less than 10%.  (Progressing)       Start:  11/16/23    Expected End:  02/08/24            Develop and issue HEP to help maximize ROM, strength and  tolerance to help maximize return to all pre-onset activities.  (Progressing)       Start:  11/16/23    Expected End:  02/08/24            Pt will demonstrate increased FMC in R hand as indicated by increased 9HPT score 2-5 seconds as compared to L hand.  (Progressing)       Start:  11/16/23    Expected End:  02/08/24            Pt will demonstrate increased  strength as appropriate with the R  to be greater than or equal to 80% of the L to help patient resume ADLs and IADLs.  (Progressing)       Start:  11/16/23    Expected End:  02/08/24

## 2023-12-13 ENCOUNTER — APPOINTMENT (OUTPATIENT)
Dept: OCCUPATIONAL THERAPY | Facility: HOSPITAL | Age: 83
End: 2023-12-13
Payer: MEDICARE

## 2023-12-18 ENCOUNTER — TREATMENT (OUTPATIENT)
Dept: OCCUPATIONAL THERAPY | Facility: HOSPITAL | Age: 83
End: 2023-12-18
Payer: MEDICARE

## 2023-12-18 PROCEDURE — 97110 THERAPEUTIC EXERCISES: CPT | Mod: GO,CO

## 2023-12-18 PROCEDURE — 97530 THERAPEUTIC ACTIVITIES: CPT | Mod: GO,CO

## 2023-12-18 ASSESSMENT — PAIN - FUNCTIONAL ASSESSMENT: PAIN_FUNCTIONAL_ASSESSMENT: 0-10

## 2023-12-18 ASSESSMENT — PAIN SCALES - GENERAL: PAINLEVEL_OUTOF10: 0 - NO PAIN

## 2023-12-18 NOTE — PROGRESS NOTES
Occupational Therapy    OT Treatment    Patient Name: David Ahuja  MRN: [id number]  Today's Date: 12/18/2023  Visit 9  Time Calculation  Start Time: 0945  Stop Time: 1030  Time Calculation (min): 45 min      Assessment:  Pt able to perform thumb opposition to R LF today, but doesn't have enough strength to  blocks off of velco yet with therapeutic activity. Pt shown an increase in R  strength of 12 PSI today compared to  strength taken on 12/4/23.  OT Assessment Results: Decreased ADL status, Decreased upper extremity range of motion, Decreased upper extremity strength, Decreased fine motor control, Decreased endurance    Plan:  Pt to continue with strength and FM coordination with R hand to progress ADL and IADL performances.     Subjective   Pt reports he has noticed his daily life activities are getting much easier now with the use of R hand. Pt reports coordination with R LF is still a challenge but when trying now he can pinch right pinky to thumb and straighten out his pinky when trying.    Pain:  Pain Assessment  Pain Assessment: 0-10  Pain Score: 0 - No pain    Objective    Exercises: Reps:   Pink theraputty     5 digit position 1 x 5 reps  Thumb opposition   Hand strengthening    MH with passive stretch 8 minutes digit extension and flexion   HEP provided on 11/16/2023    Activities:    Pegboard    Weighted pinch clips    Velcro Board With thumb opposition with each finger on and off with blocks   Purdue Pegboard 1 x 9 pegs, washers and nuts on and off RIF 3 fumbles   In hand-manipulation    Valpar    Modalities:                Manual:                    Functional review:     Completed on: 12/18/2023       Trials Average   RUE 63, 64, 58 62   LUE 81, 79, 84 82     Pts R  strength is 75% of L  strength.    OP EDUCATION:  Education  Patient Response to Education: Patient/Caregiver Verbalized Understanding of Information  Education Comment: Education provided to pt regarding  continuing to use UE during ADL and IADL tasks at home.    Goals:  Active       OT Goals       LTG - Patient will indicate/ demonstrate the ability to resume all preinjury ADLs and IADLs without significant limits secondary to decreased ROM, decreased strength and/or pain as indicated by Quickdash score of less than 10%.  (Progressing)       Start:  11/16/23    Expected End:  02/08/24            Develop and issue HEP to help maximize ROM, strength and tolerance to help maximize return to all pre-onset activities.  (Progressing)       Start:  11/16/23    Expected End:  02/08/24            Pt will demonstrate increased FMC in R hand as indicated by increased 9HPT score 2-5 seconds as compared to L hand.  (Progressing)       Start:  11/16/23    Expected End:  02/08/24            Pt will demonstrate increased  strength as appropriate with the R  to be greater than or equal to 80% of the L to help patient resume ADLs and IADLs.  (Progressing)       Start:  11/16/23    Expected End:  02/08/24

## 2023-12-19 ENCOUNTER — HOSPITAL ENCOUNTER (OUTPATIENT)
Dept: CARDIOLOGY | Facility: HOSPITAL | Age: 83
Discharge: HOME | End: 2023-12-19
Payer: MEDICARE

## 2023-12-19 DIAGNOSIS — I69.30 UNSPECIFIED SEQUELAE OF CEREBRAL INFARCTION: ICD-10-CM

## 2023-12-19 DIAGNOSIS — R29.818 NEUROLOGIC ABNORMALITY: ICD-10-CM

## 2023-12-19 PROCEDURE — 93225 XTRNL ECG REC<48 HRS REC: CPT

## 2023-12-19 PROCEDURE — 93227 XTRNL ECG REC<48 HR R&I: CPT | Performed by: INTERNAL MEDICINE

## 2023-12-20 ENCOUNTER — TREATMENT (OUTPATIENT)
Dept: OCCUPATIONAL THERAPY | Facility: HOSPITAL | Age: 83
End: 2023-12-20
Payer: MEDICARE

## 2023-12-20 DIAGNOSIS — R27.8 ABNORMAL COORDINATION: ICD-10-CM

## 2023-12-20 DIAGNOSIS — R29.818 NEUROLOGIC ABNORMALITY: ICD-10-CM

## 2023-12-20 DIAGNOSIS — M21.331 RIGHT WRIST DROP: ICD-10-CM

## 2023-12-20 DIAGNOSIS — R29.898 RIGHT HAND WEAKNESS: ICD-10-CM

## 2023-12-20 PROCEDURE — 97530 THERAPEUTIC ACTIVITIES: CPT | Mod: GO,CO

## 2023-12-20 ASSESSMENT — PAIN SCALES - GENERAL: PAINLEVEL_OUTOF10: 0 - NO PAIN

## 2023-12-20 ASSESSMENT — PAIN - FUNCTIONAL ASSESSMENT: PAIN_FUNCTIONAL_ASSESSMENT: 0-10

## 2023-12-20 NOTE — PROGRESS NOTES
Occupational Therapy    OT Treatment    Patient Name: David Ahuja  MRN: [id number]  Today's Date: 12/20/2023  Visit 10  Time Calculation  Start Time: 1000  Stop Time: 1045  Time Calculation (min): 45 min      Assessment:  Pt did well with Valpar and felt challenge with upgraded task today. Pt reports UE fatigue and needed rest break were taken.  OT Assessment Results: Decreased ADL status, Decreased upper extremity range of motion, Decreased upper extremity strength, Decreased fine motor control, Decreased endurance    Plan:  Pt to continue with FM and Gross motor strengthening and coordination to progress ADL and IADL performances.     Subjective   Pt reports getting easier to complete task with R hand now, but reports hand writing is still shaky and UE is still weak and tires easily.     Pain:  Pain Assessment  Pain Assessment: 0-10  Pain Score: 0 - No pain    Objective    Exercises: Reps:   Pink theraputty     5 digit position 1 x 5 reps  Thumb opposition   Hand strengthening    MH with passive stretch 8 minutes digit extension and flexion   HEP provided on 11/16/2023    Activities:    Pegboard    Weighted pinch clips    Velcro Board Valpar 3 shapes on and off panel #1 to panel #2 2 drops   Purdue Pegboard 1 x 9 pegs, washers and nuts on and off RIF 3 fumbles   In hand-manipulation    Valpar    Modalities:                Manual:                    Functional review:     Completed on: 12/20/2023 Quickdash           OP EDUCATION:  Education  Individual(s) Educated: Patient  Education Provided: Diagnosis & Precautions  Home Program: AROM, Fine motor tasks  Patient Response to Education: Patient/Caregiver Verbalized Understanding of Information  Education Comment: Education provided to pt regarding continuing to use UE during ADL and IADL tasks at home.    Goals:  Active       OT Goals       LTG - Patient will indicate/ demonstrate the ability to resume all preinjury ADLs and IADLs without significant limits  secondary to decreased ROM, decreased strength and/or pain as indicated by Quickdash score of less than 10%.  (Progressing)       Start:  11/16/23    Expected End:  02/08/24            Develop and issue HEP to help maximize ROM, strength and tolerance to help maximize return to all pre-onset activities.  (Progressing)       Start:  11/16/23    Expected End:  02/08/24            Pt will demonstrate increased FMC in R hand as indicated by increased 9HPT score 2-5 seconds as compared to L hand.  (Progressing)       Start:  11/16/23    Expected End:  02/08/24            Pt will demonstrate increased  strength as appropriate with the R  to be greater than or equal to 80% of the L to help patient resume ADLs and IADLs.  (Progressing)       Start:  11/16/23    Expected End:  02/08/24

## 2023-12-21 ENCOUNTER — HOSPITAL ENCOUNTER (OUTPATIENT)
Dept: CARDIOLOGY | Facility: HOSPITAL | Age: 83
Discharge: HOME | End: 2023-12-21
Payer: MEDICARE

## 2023-12-21 DIAGNOSIS — I69.30 UNSPECIFIED SEQUELAE OF CEREBRAL INFARCTION: ICD-10-CM

## 2023-12-21 DIAGNOSIS — G45.8 OTHER TRANSIENT CEREBRAL ISCHEMIC ATTACKS AND RELATED SYNDROMES: ICD-10-CM

## 2023-12-21 LAB
AORTIC VALVE MEAN GRADIENT: 2.8
AORTIC VALVE PEAK VELOCITY: 1.17
AV PEAK GRADIENT: 5.5
AVA (PEAK VEL): 3.07
AVA (VTI): 3.75
EJECTION FRACTION APICAL 4 CHAMBER: 48.6
EJECTION FRACTION: 45
LEFT ATRIUM VOLUME AREA LENGTH INDEX BSA: 28.9
LEFT VENTRICLE INTERNAL DIMENSION DIASTOLE: 5.12 (ref 3.5–6)
LEFT VENTRICULAR OUTFLOW TRACT DIAMETER: 2.4
MITRAL VALVE E/A RATIO: 0.52
MITRAL VALVE E/E' RATIO: 6.68
RIGHT VENTRICLE FREE WALL PEAK S': 17.45
RIGHT VENTRICLE PEAK SYSTOLIC PRESSURE: 30
TRICUSPID ANNULAR PLANE SYSTOLIC EXCURSION: 3

## 2023-12-21 PROCEDURE — 93306 TTE W/DOPPLER COMPLETE: CPT | Performed by: INTERNAL MEDICINE

## 2023-12-21 PROCEDURE — 93306 TTE W/DOPPLER COMPLETE: CPT

## 2023-12-27 ENCOUNTER — TREATMENT (OUTPATIENT)
Dept: OCCUPATIONAL THERAPY | Facility: HOSPITAL | Age: 83
End: 2023-12-27
Payer: MEDICARE

## 2023-12-27 DIAGNOSIS — R27.8 ABNORMAL COORDINATION: ICD-10-CM

## 2023-12-27 DIAGNOSIS — R29.898 RIGHT HAND WEAKNESS: ICD-10-CM

## 2023-12-27 DIAGNOSIS — M21.331 RIGHT WRIST DROP: ICD-10-CM

## 2023-12-27 DIAGNOSIS — R29.818 NEUROLOGIC ABNORMALITY: ICD-10-CM

## 2023-12-28 ENCOUNTER — APPOINTMENT (OUTPATIENT)
Dept: OCCUPATIONAL THERAPY | Facility: HOSPITAL | Age: 83
End: 2023-12-28
Payer: MEDICARE

## 2024-01-03 ENCOUNTER — APPOINTMENT (OUTPATIENT)
Dept: OCCUPATIONAL THERAPY | Facility: CLINIC | Age: 84
End: 2024-01-03
Payer: MEDICARE

## 2024-01-10 ENCOUNTER — HOSPITAL ENCOUNTER (OUTPATIENT)
Dept: VASCULAR MEDICINE | Facility: HOSPITAL | Age: 84
Discharge: HOME | End: 2024-01-10
Payer: MEDICARE

## 2024-01-10 DIAGNOSIS — R09.89 OTHER SPECIFIED SYMPTOMS AND SIGNS INVOLVING THE CIRCULATORY AND RESPIRATORY SYSTEMS: ICD-10-CM

## 2024-01-10 DIAGNOSIS — I69.30 UNSPECIFIED SEQUELAE OF CEREBRAL INFARCTION: ICD-10-CM

## 2024-01-10 PROCEDURE — 93880 EXTRACRANIAL BILAT STUDY: CPT

## 2024-01-10 PROCEDURE — 93880 EXTRACRANIAL BILAT STUDY: CPT | Performed by: SURGERY

## 2024-01-31 ENCOUNTER — APPOINTMENT (OUTPATIENT)
Dept: CARDIOLOGY | Facility: CLINIC | Age: 84
End: 2024-01-31
Payer: MEDICARE

## 2024-02-16 ENCOUNTER — DOCUMENTATION (OUTPATIENT)
Dept: OCCUPATIONAL THERAPY | Facility: HOSPITAL | Age: 84
End: 2024-02-16
Payer: MEDICARE

## 2024-02-16 NOTE — PROGRESS NOTES
Occupational Therapy    Discharge Summary    Name: David Ahuja  MRN: [id number]  : 1940  Date: 24    Discharge Summary: OT    Discharge Information: Date of discharge 24, Date of last visit 23, Date of evaluation 23, Number of attended visits 11, Referred by Dr. Henley, and Referred for R hand weakness.    Therapy Summary: Skilled OT services addressed ROM, strength, and HEP to increase independence with ADL and IADL tasks.     Discharge Status: Pt making progress toward goals, increased FMC and strength.      Rehab Discharge Reason: Other Insurance did not authorize continued OT treatment.

## 2024-02-21 PROBLEM — K80.20 GALLSTONES: Status: ACTIVE | Noted: 2024-02-21

## 2024-02-21 PROBLEM — E26.1 SECONDARY HYPERALDOSTERONISM (MULTI): Status: ACTIVE | Noted: 2024-02-21

## 2024-02-21 PROBLEM — I69.30 SEQUELAE OF CEREBRAL INFARCTION: Status: ACTIVE | Noted: 2024-02-21

## 2024-02-21 PROBLEM — R27.9 COORDINATION PROBLEM: Status: ACTIVE | Noted: 2023-11-16

## 2024-02-21 PROBLEM — G45.9 TRANSIENT ISCHEMIC ATTACK: Status: ACTIVE | Noted: 2024-02-21

## 2024-02-21 PROBLEM — R29.90 NEUROLOGICAL SYMPTOMS: Status: ACTIVE | Noted: 2023-11-16

## 2024-02-21 RX ORDER — ASPIRIN 81 MG/1
81 TABLET ORAL DAILY
COMMUNITY

## 2024-02-21 RX ORDER — METOPROLOL SUCCINATE 100 MG/1
100 TABLET, EXTENDED RELEASE ORAL DAILY
COMMUNITY

## 2024-02-21 RX ORDER — LOSARTAN POTASSIUM 100 MG/1
100 TABLET ORAL DAILY
COMMUNITY

## 2024-02-21 RX ORDER — FUROSEMIDE 20 MG/1
20 TABLET ORAL 2 TIMES DAILY
COMMUNITY

## 2024-02-21 RX ORDER — ATORVASTATIN CALCIUM 40 MG/1
40 TABLET, FILM COATED ORAL DAILY
COMMUNITY

## 2024-02-21 RX ORDER — MELOXICAM 15 MG/1
15 TABLET ORAL DAILY
COMMUNITY
Start: 2023-07-24 | End: 2024-05-29 | Stop reason: WASHOUT

## 2024-02-21 RX ORDER — MULTIVITAMIN
1 TABLET ORAL DAILY
COMMUNITY
End: 2024-03-27 | Stop reason: WASHOUT

## 2024-02-21 RX ORDER — TERBINAFINE HYDROCHLORIDE 250 MG/1
250 TABLET ORAL DAILY
COMMUNITY

## 2024-02-22 ENCOUNTER — OFFICE VISIT (OUTPATIENT)
Dept: CARDIOLOGY | Facility: CLINIC | Age: 84
End: 2024-02-22
Payer: MEDICARE

## 2024-02-22 VITALS
SYSTOLIC BLOOD PRESSURE: 148 MMHG | HEIGHT: 70 IN | DIASTOLIC BLOOD PRESSURE: 80 MMHG | HEART RATE: 79 BPM | BODY MASS INDEX: 29.06 KG/M2 | WEIGHT: 203 LBS

## 2024-02-22 DIAGNOSIS — G45.9 TIA (TRANSIENT ISCHEMIC ATTACK): ICD-10-CM

## 2024-02-22 DIAGNOSIS — R93.1 ABNORMAL ECHOCARDIOGRAM: ICD-10-CM

## 2024-02-22 DIAGNOSIS — I50.22 CHRONIC SYSTOLIC (CONGESTIVE) HEART FAILURE (MULTI): ICD-10-CM

## 2024-02-22 DIAGNOSIS — R94.31 ABNORMAL EKG: ICD-10-CM

## 2024-02-22 DIAGNOSIS — I10 PRIMARY HYPERTENSION: ICD-10-CM

## 2024-02-22 DIAGNOSIS — I42.9 CARDIOMYOPATHY, UNSPECIFIED TYPE (MULTI): ICD-10-CM

## 2024-02-22 DIAGNOSIS — I65.22 STENOSIS OF LEFT CAROTID ARTERY: Primary | ICD-10-CM

## 2024-02-22 PROCEDURE — 1159F MED LIST DOCD IN RCRD: CPT | Performed by: INTERNAL MEDICINE

## 2024-02-22 PROCEDURE — 99205 OFFICE O/P NEW HI 60 MIN: CPT | Performed by: INTERNAL MEDICINE

## 2024-02-22 PROCEDURE — 1126F AMNT PAIN NOTED NONE PRSNT: CPT | Performed by: INTERNAL MEDICINE

## 2024-02-22 PROCEDURE — 3079F DIAST BP 80-89 MM HG: CPT | Performed by: INTERNAL MEDICINE

## 2024-02-22 PROCEDURE — 3077F SYST BP >= 140 MM HG: CPT | Performed by: INTERNAL MEDICINE

## 2024-02-22 PROCEDURE — 93000 ELECTROCARDIOGRAM COMPLETE: CPT | Performed by: INTERNAL MEDICINE

## 2024-02-22 PROCEDURE — 1160F RVW MEDS BY RX/DR IN RCRD: CPT | Performed by: INTERNAL MEDICINE

## 2024-02-22 RX ORDER — AMLODIPINE BESYLATE 5 MG/1
5 TABLET ORAL DAILY
Qty: 30 TABLET | Refills: 11 | Status: SHIPPED | OUTPATIENT
Start: 2024-02-22 | End: 2024-03-27 | Stop reason: SDUPTHER

## 2024-02-22 RX ORDER — REGADENOSON 0.08 MG/ML
0.4 INJECTION, SOLUTION INTRAVENOUS
Status: CANCELLED | OUTPATIENT
Start: 2024-02-22

## 2024-02-22 NOTE — PROGRESS NOTES
" Chief Complaint:   New Patient Visit     History Of Present Illness:    David Ahuja is a 83 y.o. male who is here for evaluation of abnormal echocardiogram.  He states he had a stroke or TIA recently that led to further investigations.  She had a 24-hour Holter that did not show any atrial fibrillation.  He had an echo that showed LV dysfunction with ejection fraction of 40 to 45% with multiple wall motion abnormality.  He had a carotid duplex that showed 50 to 69% stenosis in the left carotid artery with less than 50% on the other side.  He states that he has ankle swelling going on for 2 years has been managed well with diuretics.  He denies any shortness of breath or chest pain.    He has a remote history of smoking.  His EKG today shows normal sinus rhythm nonspecific IVCD abnormal T waves inferiorly.    Family history pertinent for mother having congestive heart failure.     Last Recorded Vitals:  Vitals:    02/22/24 1351   BP: 148/80   Pulse: 79   Weight: 92.1 kg (203 lb)   Height: 1.778 m (5' 10\")       Past Medical History:  He has a past medical history of Personal history of other diseases of the circulatory system, Personal history of other endocrine, nutritional and metabolic disease, and Personal history of other malignant neoplasm of skin.    Past Surgical History:  He has a past surgical history that includes Other surgical history (12/21/2018).      Social History:  He reports that he has never smoked. His smokeless tobacco use includes chew. He reports that he does not currently use alcohol. He reports that he does not use drugs.    Family History:  No family history on file.     Allergies:  Patient has no known allergies.    Outpatient Medications:  Current Outpatient Medications   Medication Instructions    aspirin 81 mg, oral, Daily    atorvastatin (LIPITOR) 40 mg, oral, Daily    furosemide (LASIX) 20 mg, oral, 2 times daily    losartan (COZAAR) 100 mg, oral, Daily    meloxicam (MOBIC) 15 mg, " oral, Daily    metoprolol succinate XL (TOPROL-XL) 100 mg, oral, Daily    multivit with min-folic acid 0.4 mg tablet 1 tablet, oral, Daily    terbinafine (LAMISIL) 250 mg, oral, Daily       Physical Exam:  Physical Exam  Vitals reviewed.   Constitutional:       Appearance: Normal appearance.   Neck:      Vascular: No carotid bruit or JVD.   Cardiovascular:      Rate and Rhythm: Normal rate and regular rhythm.      Heart sounds: Normal heart sounds, S1 normal and S2 normal. No murmur heard.  Pulmonary:      Effort: Pulmonary effort is normal.      Breath sounds: Normal breath sounds.   Abdominal:      General: Abdomen is flat. Bowel sounds are normal.      Palpations: Abdomen is soft.   Musculoskeletal:      Right lower le+ Pitting Edema present.      Left lower le+ Pitting Edema present.   Skin:     General: Skin is warm.   Neurological:      Mental Status: He is alert. Mental status is at baseline.   Psychiatric:         Mood and Affect: Mood normal.         Behavior: Behavior normal.           Last Labs:  CBC -  Lab Results   Component Value Date    WBC 8.2 2019    HGB 13.5 2019    HCT 42.6 2019    MCV 88 2019     2019       CMP -  Lab Results   Component Value Date    CALCIUM 9.6 2023    PROT 7.0 2023    ALBUMIN 4.1 2023    AST 17 2023    ALT 17 2023    ALKPHOS 89 2023    BILITOT 0.4 2023       LIPID PANEL -   Lab Results   Component Value Date    CHOL 175 2023    TRIG 290 (H) 2023    HDL 36.2 (A) 2023    CHHDL 4.8 2023    LDLF 81 2023    VLDL 58 (H) 2023    NHDL 139 2023       RENAL FUNCTION PANEL -   Lab Results   Component Value Date    GLUCOSE 125 (H) 2023     2023    K 5.2 2023    CL 97 (L) 2023    CO2 33 (H) 2023    ANIONGAP 11 2023    BUN 27 (H) 2023    CREATININE 1.22 2023    GFRMALE 59 (A) 2023    CALCIUM 9.6  "08/23/2023    ALBUMIN 4.1 08/23/2023        No results found for: \"BNP\", \"HGBA1C\"    Last Cardiology Tests:  ECG:  No results found for this or any previous visit from the past 1095 days.      Echo:  Transthoracic Echo (TTE) Complete 12/21/2023      Ejection Fractions:  EF   Date/Time Value Ref Range Status   12/21/2023 08:56 AM 45         Cath:  No results found for this or any previous visit from the past 1095 days.      Stress Test:  No results found for this or any previous visit from the past 1095 days.      Cardiac Imaging:  No results found for this or any previous visit from the past 1095 days.          Assessment/Plan   1-chronic systolic heart failure/cardiomyopathy-he is on appropriate medical therapy and appears well compensated.  Unfortunately will not tolerate Aldactone as potassium levels are already quite high.  Will add Norvasc.  Target blood pressure less than 130/80 lower if possible.    I will arrange for a stress test for risk stratification.    2-peripheral vascular disease-with carotid artery disease and multiple cardiovascular risk factors patient remains on a high-dose potent statins and antiplatelet therapy recent history of TIA as well.  If has coronary artery disease may benefit from low-dose Xarelto will consider this in future.    3-uncontrolled hypertension -added Norvasc see above.      Medardo Armando MD  "

## 2024-03-21 RX ORDER — MULTIVIT-MIN/FA/LYCOPEN/LUTEIN .4-300-25
TABLET ORAL
COMMUNITY

## 2024-03-21 RX ORDER — ATORVASTATIN CALCIUM 20 MG/1
TABLET, FILM COATED ORAL
COMMUNITY
End: 2024-03-27 | Stop reason: WASHOUT

## 2024-03-21 RX ORDER — KETOCONAZOLE 20 MG/G
1 CREAM TOPICAL EVERY 24 HOURS
COMMUNITY
End: 2024-03-27 | Stop reason: WASHOUT

## 2024-03-21 RX ORDER — METOPROLOL SUCCINATE 50 MG/1
TABLET, EXTENDED RELEASE ORAL
COMMUNITY
End: 2024-03-27 | Stop reason: WASHOUT

## 2024-03-21 RX ORDER — FOSINOPRIL SODIUM 40 MG/1
TABLET ORAL
COMMUNITY
End: 2024-03-27 | Stop reason: WASHOUT

## 2024-03-21 RX ORDER — AMLODIPINE BESYLATE 10 MG/1
TABLET ORAL
COMMUNITY
Start: 2023-03-29 | End: 2024-03-27 | Stop reason: WASHOUT

## 2024-03-21 RX ORDER — PHENOL 1.4 %
AEROSOL, SPRAY (ML) MUCOUS MEMBRANE
COMMUNITY

## 2024-03-25 NOTE — PROGRESS NOTES
"Primary Cardiologist: Dr. Stern    Chief Complaint:   No chief complaint on file.     History Of Present Illness:    David Ahuja is a 83 y.o. male with past medical history of stroke, LV dysfunction HFrEF with EF 40-45%, Left carotid stenosis (50-69%) who recently was evaluated for abnormal echo, a nuclear stress test was ordered for ischemic evaluation. He presents today for follow up.      Today, David Ahuja is feeling well overall. He is here today with his wife.   Denies chest pain/pressure, no dizziness or lightheadedness, no shortness of breath, and no palpitations. He reports chronic trace lower extremity edema that is not worsening, denies orthopnea or PND.     Last Recorded Vitals:  Visit Vitals  /78   Pulse 92   Ht 1.778 m (5' 10\")   Wt 92.8 kg (204 lb 9.6 oz)   BMI 29.36 kg/m²   Smoking Status Never   BSA 2.14 m²        Past Medical History:  He has a past medical history of Personal history of other diseases of the circulatory system, Personal history of other endocrine, nutritional and metabolic disease, and Personal history of other malignant neoplasm of skin.    Past Surgical History:  He has a past surgical history that includes Other surgical history (12/21/2018).      Social History:  He reports that he has never smoked. His smokeless tobacco use includes chew. He reports that he does not currently use alcohol. He reports that he does not use drugs.    Family History:  No family history on file.     Allergies:  Patient has no known allergies.    Outpatient Medications:  Current Outpatient Medications   Medication Instructions    amLODIPine (Norvasc) 10 mg tablet     amLODIPine (NORVASC) 5 mg, oral, Daily    aspirin 81 mg, oral, Daily    atorvastatin (Lipitor) 20 mg tablet TAKE ONE (1) TABLET BY MOUTH ONCE DAILY. **(LIPITOR 20 MG TAB EQUIV)**    atorvastatin (LIPITOR) 40 mg, oral, Daily    fosinopril (Monopril) 40 mg tablet oral    furosemide (LASIX) 20 mg, oral, 2 times daily    " ketoconazole (NIZOral) 2 % cream 1 Application, Every 24 hours    losartan (COZAAR) 100 mg, oral, Daily    melatonin-lemon balm leaf extr 10-1 mg tablet oral    meloxicam (MOBIC) 15 mg, oral, Daily    metoprolol succinate XL (Toprol-XL) 50 mg 24 hr tablet oral    metoprolol succinate XL (TOPROL-XL) 100 mg, oral, Daily    multivit with min-folic acid 0.4 mg tablet 1 tablet, oral, Daily    multivitamin with minerals iron-free (Centrum Silver) oral    terbinafine (LAMISIL) 250 mg, oral, Daily         Review of Systems   Constitutional: Negative for malaise/fatigue.   HENT: Negative.     Eyes: Negative.    Cardiovascular:  Negative for chest pain, dyspnea on exertion, leg swelling (trace) and palpitations.   Respiratory:  Negative for shortness of breath.    Endocrine: Negative.    Hematologic/Lymphatic: Negative.    Skin: Negative.    Musculoskeletal: Negative.    Gastrointestinal: Negative.    Genitourinary: Negative.    Neurological:  Negative for dizziness and light-headedness.        Physical Exam  Vitals reviewed.   Constitutional:       Appearance: Normal appearance.   HENT:      Head: Normocephalic.      Mouth/Throat:      Mouth: Mucous membranes are moist.   Cardiovascular:      Rate and Rhythm: Normal rate and regular rhythm.      Pulses: Normal pulses.      Heart sounds: Normal heart sounds.   Pulmonary:      Effort: Pulmonary effort is normal.      Breath sounds: Normal breath sounds. No wheezing, rhonchi or rales.   Abdominal:      General: Bowel sounds are normal. There is no distension.      Palpations: Abdomen is soft.      Tenderness: There is no abdominal tenderness.   Musculoskeletal:      Cervical back: Normal range of motion.      Right lower leg: Edema (trace) present.      Left lower leg: Edema (trace) present.   Skin:     General: Skin is warm and dry.   Neurological:      General: No focal deficit present.      Mental Status: He is alert and oriented to person, place, and time.   Psychiatric:     "     Mood and Affect: Mood normal.         Behavior: Behavior normal.           Last Labs:  CBC -  Lab Results   Component Value Date    WBC 8.2 01/16/2019    HGB 13.5 01/16/2019    HCT 42.6 01/16/2019    MCV 88 01/16/2019     01/16/2019       CMP -  Lab Results   Component Value Date    CALCIUM 9.6 08/23/2023    PROT 7.0 08/23/2023    ALBUMIN 4.1 08/23/2023    AST 17 08/23/2023    ALT 17 08/23/2023    ALKPHOS 89 08/23/2023    BILITOT 0.4 08/23/2023       LIPID PANEL -   Lab Results   Component Value Date    CHOL 175 08/23/2023    TRIG 290 (H) 08/23/2023    HDL 36.2 (A) 08/23/2023    CHHDL 4.8 08/23/2023    LDLF 81 08/23/2023    VLDL 58 (H) 08/23/2023    NHDL 139 08/23/2023       RENAL FUNCTION PANEL -   Lab Results   Component Value Date    GLUCOSE 125 (H) 08/23/2023     08/23/2023    K 5.2 08/23/2023    CL 97 (L) 08/23/2023    CO2 33 (H) 08/23/2023    ANIONGAP 11 08/23/2023    BUN 27 (H) 08/23/2023    CREATININE 1.22 08/23/2023    GFRMALE 59 (A) 08/23/2023    CALCIUM 9.6 08/23/2023    ALBUMIN 4.1 08/23/2023        No results found for: \"BNP\", \"HGBA1C\"    Last Cardiology Tests:  ECG:  No results found for this or any previous visit from the past 1095 days.    24-hour Holter monitor 12/21/2023: Predominant rhythm was sinus bradycardia to sinus rhythm with frequent SVT.  The maximum heart rate recorded was 103 bpm and a minimum heart rate of 53 bpm and an average heart rate of 70 bpm.  There were 183 VE beats with a burden of less than 1%.  There were 5248 SVE beats with a burden of 5%.    Echo:  TTE 12/21/23:   Left ventricular systolic function is mildly decreased with a 40-45% estimated ejection fraction.   2. Multiple segmental abnormalities exist. See findings.   3. Spectral Doppler shows an impaired relaxation pattern of left ventricular diastolic filling.   4. There is low normal right ventricular systolic function.  Ejection Fractions:  EF   Date/Time Value Ref Range Status   12/21/2023 08:56 AM " 45         Cath:  No results found for this or any previous visit from the past 1095 days.      Stress Test:  Nuclear stress test ordered: not performed    Cardiac Imaging:  Carotid US 1/10/24:    Right Carotid: Findings are consistent with less than 50% stenosis of the right proximal internal carotid artery. Turbulent flow seen by color Doppler. Right external carotid artery appears patent with no evidence of stenosis. The right vertebral artery is patent with antegrade flow. No evidence of hemodynamically significant stenosis in the right subclavian artery.  Left Carotid: Today's exam was limited due to the heavy calcification with resulting shadowing. Findings are consistent with 50 to 69% stenosis of the left proximal internal carotid artery. Turbulent flow seen by color Doppler. There are elevated velocities in the left ECA that are suggestive of disease. The left vertebral artery is patent with antegrade flow. No evidence of hemodynamically significant stenosis in the left subclavian artery.    Lab review: I have personally reviewed the laboratory result(s)     Assessment/Plan       David Ahuja is a 83 y.o. male with past medical history of stroke, LV dysfunction HFrEF with EF 40-45%, Left carotid stenosis (50-69%) who recently was evaluated for abnormal echo, a nuclear stress test was ordered for ischemic evaluation. He presents today for follow up.        Chronic systolic heart failure, HFrEF,   TTE 12/21/23:Left ventricular systolic function is mildly decreased with a 40-45% estimated ejection fraction Multiple segmental abnormalities exist. See findings. Spectral Doppler shows an impaired relaxation pattern of left ventricular diastolic filling. There is low normal right ventricular systolic function.  Today, denies shortness of breath, has trace lower extremity edema that he reports is stable.   Continue on losartan 100 mg daily, metoprolol succinate 100 mg daily, furosemide 20 mg daily  Labs ordered to  monitor kidney function and electrolytes  Ischemic eval ordered with stress test at last office visit, this was not scheduled and will get scheduled today.     PVD  Carotid duplex: Right Carotid: Findings are consistent with less than 50% stenosis of the right proximal internal carotid artery. Left Carotid: Today's exam was limited due to the heavy calcification with resulting shadowing. Findings are consistent with 50 to 69% stenosis of the left proximal internal carotid artery.  Reports he is not following with vascular, PCP is following.   Continue on statin, antiplatelet    History of Stroke vs TIA  No afib on 24 hour monitor  Continue on statin, antiplatelet    Hypertension  Uncontrolled at last visit  Today's BP in office: 158/78, does not take home readings.   Recently added Amlodipine 5 mg daily, will increase amlodipine to 10 mg daily today.  Continue on losartan 100 mg daily, metoprolol succinate 100 mg daily    Audrey Gay, APRN-CNP

## 2024-03-27 ENCOUNTER — OFFICE VISIT (OUTPATIENT)
Dept: CARDIOLOGY | Facility: CLINIC | Age: 84
End: 2024-03-27
Payer: MEDICARE

## 2024-03-27 VITALS
HEIGHT: 70 IN | BODY MASS INDEX: 29.29 KG/M2 | SYSTOLIC BLOOD PRESSURE: 158 MMHG | HEART RATE: 92 BPM | DIASTOLIC BLOOD PRESSURE: 78 MMHG | WEIGHT: 204.6 LBS

## 2024-03-27 DIAGNOSIS — I42.9 CARDIOMYOPATHY, UNSPECIFIED TYPE (MULTI): ICD-10-CM

## 2024-03-27 DIAGNOSIS — G45.9 TIA (TRANSIENT ISCHEMIC ATTACK): ICD-10-CM

## 2024-03-27 DIAGNOSIS — I10 PRIMARY HYPERTENSION: ICD-10-CM

## 2024-03-27 DIAGNOSIS — I65.22 STENOSIS OF LEFT CAROTID ARTERY: Primary | ICD-10-CM

## 2024-03-27 DIAGNOSIS — R93.1 ABNORMAL ECHOCARDIOGRAM: ICD-10-CM

## 2024-03-27 PROCEDURE — 1159F MED LIST DOCD IN RCRD: CPT | Performed by: CLINICAL NURSE SPECIALIST

## 2024-03-27 PROCEDURE — 99213 OFFICE O/P EST LOW 20 MIN: CPT | Performed by: CLINICAL NURSE SPECIALIST

## 2024-03-27 PROCEDURE — 3077F SYST BP >= 140 MM HG: CPT | Performed by: CLINICAL NURSE SPECIALIST

## 2024-03-27 PROCEDURE — 3078F DIAST BP <80 MM HG: CPT | Performed by: CLINICAL NURSE SPECIALIST

## 2024-03-27 PROCEDURE — 1160F RVW MEDS BY RX/DR IN RCRD: CPT | Performed by: CLINICAL NURSE SPECIALIST

## 2024-03-27 RX ORDER — AMLODIPINE BESYLATE 10 MG/1
10 TABLET ORAL DAILY
Qty: 90 TABLET | Refills: 2 | Status: SHIPPED | OUTPATIENT
Start: 2024-03-27 | End: 2025-03-27

## 2024-03-27 ASSESSMENT — ENCOUNTER SYMPTOMS
SHORTNESS OF BREATH: 0
DYSPNEA ON EXERTION: 0
HEMATOLOGIC/LYMPHATIC NEGATIVE: 1
PALPITATIONS: 0
EYES NEGATIVE: 1
MUSCULOSKELETAL NEGATIVE: 1
DIZZINESS: 0
ENDOCRINE NEGATIVE: 1
GASTROINTESTINAL NEGATIVE: 1
LIGHT-HEADEDNESS: 0

## 2024-03-27 NOTE — PATIENT INSTRUCTIONS
Increase your amlodipine to 10 mg daily. You can take two of your 5 mg tablets until you receive your new prescription.   The stress test that was ordered at last visit will be scheduled for you today.   Labs  were ordered today to monitor your cholesterol, kidney and liver function. These should be done fasting.  Call our office with any new cardiac concerns  Continue current medications  Continue heart-healthy diet. A diet low in sodium, low in cholesterol, limiting red meats and eating whole foods.   Follow up with Dr. Armando in May

## 2024-04-25 ENCOUNTER — HOSPITAL ENCOUNTER (OUTPATIENT)
Dept: RADIOLOGY | Facility: HOSPITAL | Age: 84
Discharge: HOME | End: 2024-04-25
Payer: MEDICARE

## 2024-04-25 ENCOUNTER — HOSPITAL ENCOUNTER (OUTPATIENT)
Dept: CARDIOLOGY | Facility: HOSPITAL | Age: 84
Discharge: HOME | End: 2024-04-25
Payer: MEDICARE

## 2024-04-25 DIAGNOSIS — G45.9 TIA (TRANSIENT ISCHEMIC ATTACK): ICD-10-CM

## 2024-04-25 DIAGNOSIS — I10 PRIMARY HYPERTENSION: ICD-10-CM

## 2024-04-25 DIAGNOSIS — R93.1 ABNORMAL ECHOCARDIOGRAM: ICD-10-CM

## 2024-04-25 DIAGNOSIS — I50.22 CHRONIC SYSTOLIC (CONGESTIVE) HEART FAILURE (MULTI): ICD-10-CM

## 2024-04-25 DIAGNOSIS — I42.9 CARDIOMYOPATHY, UNSPECIFIED TYPE (MULTI): ICD-10-CM

## 2024-04-25 DIAGNOSIS — R94.31 ABNORMAL EKG: ICD-10-CM

## 2024-04-25 LAB
ATRIAL RATE: 85 BPM
P AXIS: 62 DEGREES
PR INTERVAL: 175 MS
Q ONSET: 251 MS
QRS COUNT: 14 BEATS
QRS DURATION: 158 MS
QT INTERVAL: 403 MS
QTC CALCULATION(BAZETT): 474 MS
QTC FREDERICIA: 449 MS
R AXIS: -32 DEGREES
T AXIS: 124 DEGREES
T OFFSET: 452 MS
VENTRICULAR RATE: 83 BPM

## 2024-04-25 PROCEDURE — 2500000004 HC RX 250 GENERAL PHARMACY W/ HCPCS (ALT 636 FOR OP/ED): Performed by: INTERNAL MEDICINE

## 2024-04-25 PROCEDURE — 78452 HT MUSCLE IMAGE SPECT MULT: CPT

## 2024-04-25 PROCEDURE — 3430000001 HC RX 343 DIAGNOSTIC RADIOPHARMACEUTICALS: Performed by: INTERNAL MEDICINE

## 2024-04-25 PROCEDURE — 93010 ELECTROCARDIOGRAM REPORT: CPT | Performed by: INTERNAL MEDICINE

## 2024-04-25 PROCEDURE — 93017 CV STRESS TEST TRACING ONLY: CPT

## 2024-04-25 PROCEDURE — 93005 ELECTROCARDIOGRAM TRACING: CPT

## 2024-04-25 PROCEDURE — A9502 TC99M TETROFOSMIN: HCPCS | Performed by: INTERNAL MEDICINE

## 2024-04-25 RX ORDER — REGADENOSON 0.08 MG/ML
0.4 INJECTION, SOLUTION INTRAVENOUS
Status: COMPLETED | OUTPATIENT
Start: 2024-04-25 | End: 2024-04-25

## 2024-04-25 RX ADMIN — TETROFOSMIN 10 MILLICURIE: 0.23 INJECTION, POWDER, LYOPHILIZED, FOR SOLUTION INTRAVENOUS at 09:15

## 2024-04-25 RX ADMIN — TETROFOSMIN 30 MILLICURIE: 0.23 INJECTION, POWDER, LYOPHILIZED, FOR SOLUTION INTRAVENOUS at 10:50

## 2024-04-25 RX ADMIN — REGADENOSON 0.4 MG: 0.08 INJECTION, SOLUTION INTRAVENOUS at 11:00

## 2024-05-10 ENCOUNTER — TELEPHONE (OUTPATIENT)
Dept: CARDIOLOGY | Facility: CLINIC | Age: 84
End: 2024-05-10
Payer: MEDICARE

## 2024-05-10 NOTE — TELEPHONE ENCOUNTER
Called him with results and plan.  Dr. Armando reviewed your stress test results.  No ischemia or blood flow problem noted.  No changes to treatment plan at this time.  Follow up as scheduled with Dr. Armando on 5/29/2024 at 10:20 AM.  He verbalized understanding.

## 2024-05-10 NOTE — TELEPHONE ENCOUNTER
----- Message from Medardo Armando MD sent at 4/25/2024  5:42 PM EDT -----  Results reviewed. No critical results, follow up as usual to discuss in details.

## 2024-05-29 ENCOUNTER — OFFICE VISIT (OUTPATIENT)
Dept: CARDIOLOGY | Facility: CLINIC | Age: 84
End: 2024-05-29
Payer: MEDICARE

## 2024-05-29 ENCOUNTER — ANCILLARY PROCEDURE (OUTPATIENT)
Dept: CARDIOLOGY | Facility: CLINIC | Age: 84
End: 2024-05-29
Payer: MEDICARE

## 2024-05-29 VITALS
SYSTOLIC BLOOD PRESSURE: 122 MMHG | BODY MASS INDEX: 29.2 KG/M2 | HEART RATE: 74 BPM | WEIGHT: 204 LBS | HEIGHT: 70 IN | DIASTOLIC BLOOD PRESSURE: 88 MMHG

## 2024-05-29 DIAGNOSIS — R94.31 ABNORMAL EKG: ICD-10-CM

## 2024-05-29 DIAGNOSIS — I50.22 CHRONIC SYSTOLIC HEART FAILURE (MULTI): ICD-10-CM

## 2024-05-29 DIAGNOSIS — G45.9 TRANSIENT ISCHEMIC ATTACK: ICD-10-CM

## 2024-05-29 DIAGNOSIS — I42.9 CARDIOMYOPATHY, UNSPECIFIED TYPE (MULTI): Primary | ICD-10-CM

## 2024-05-29 DIAGNOSIS — I10 PRIMARY HYPERTENSION: ICD-10-CM

## 2024-05-29 PROCEDURE — 99214 OFFICE O/P EST MOD 30 MIN: CPT | Performed by: INTERNAL MEDICINE

## 2024-05-29 PROCEDURE — 1160F RVW MEDS BY RX/DR IN RCRD: CPT | Performed by: INTERNAL MEDICINE

## 2024-05-29 PROCEDURE — 93000 ELECTROCARDIOGRAM COMPLETE: CPT | Performed by: INTERNAL MEDICINE

## 2024-05-29 PROCEDURE — 1159F MED LIST DOCD IN RCRD: CPT | Performed by: INTERNAL MEDICINE

## 2024-05-29 PROCEDURE — 3079F DIAST BP 80-89 MM HG: CPT | Performed by: INTERNAL MEDICINE

## 2024-05-29 PROCEDURE — 3074F SYST BP LT 130 MM HG: CPT | Performed by: INTERNAL MEDICINE

## 2024-05-29 ASSESSMENT — ENCOUNTER SYMPTOMS
DEPRESSION: 0
OCCASIONAL FEELINGS OF UNSTEADINESS: 0
LOSS OF SENSATION IN FEET: 0

## 2024-05-29 NOTE — PROGRESS NOTES
"Chief Complaint:   Follow-up (3 month f/u)     History Of Present Illness:    David Ahuja is a 83 y.o. male presenting for follow-up of abnormal echocardiogram.    He  had a stroke or TIA recently that led to further investigations.  She had a 24-hour Holter that did not show any atrial fibrillation.  He had an echo that showed LV dysfunction with ejection fraction of 40 to 45% with multiple wall motion abnormality.  He had a carotid duplex that showed 50 to 69% stenosis in the left carotid artery with less than 50% on the other side.  He states that he has ankle swelling going on for 2 years has been managed well with diuretics.  He denies any shortness of breath or chest pain.  We arrange for a pharmacological stress MPI that did not show any ischemia however the study could not be gated and we do not have a LVEF.    His symptoms are unchanged.     He has a remote history of smoking.  His EKG today shows normal sinus rhythm nonspecific IVCD abnormal T waves inferiorly.     Family history pertinent for mother having congestive heart failure.   .     Last Recorded Vitals:  Vitals:    05/29/24 1019   BP: 122/88   BP Location: Left arm   Pulse: 74   Weight: 92.5 kg (204 lb)   Height: 1.778 m (5' 10\")       Past Medical History:  He has a past medical history of Personal history of other diseases of the circulatory system, Personal history of other endocrine, nutritional and metabolic disease, and Personal history of other malignant neoplasm of skin.    Past Surgical History:  He has a past surgical history that includes Other surgical history (12/21/2018).      Social History:  He reports that he has never smoked. His smokeless tobacco use includes chew. He reports that he does not currently use alcohol. He reports that he does not use drugs.    Family History:  No family history on file.     Allergies:  Patient has no known allergies.    Outpatient Medications:  Current Outpatient Medications   Medication " Instructions    amLODIPine (NORVASC) 10 mg, oral, Daily    aspirin 81 mg, oral, Daily    atorvastatin (LIPITOR) 40 mg, oral, Daily    furosemide (LASIX) 20 mg, oral, 2 times daily    losartan (COZAAR) 100 mg, oral, Daily    melatonin-lemon balm leaf extr 10-1 mg tablet oral    meloxicam (MOBIC) 15 mg, oral, Daily    metoprolol succinate XL (TOPROL-XL) 100 mg, oral, Daily    multivitamin with minerals iron-free (Centrum Silver) oral    terbinafine (LAMISIL) 250 mg, oral, Daily       Physical Exam:  Physical Exam  Vitals reviewed.   Constitutional:       Appearance: Normal appearance.   Neck:      Vascular: No carotid bruit or JVD.   Cardiovascular:      Rate and Rhythm: Normal rate and regular rhythm.      Heart sounds: Normal heart sounds, S1 normal and S2 normal. No murmur heard.  Pulmonary:      Effort: Pulmonary effort is normal.      Breath sounds: Normal breath sounds.   Abdominal:      General: Abdomen is flat. Bowel sounds are normal.      Palpations: Abdomen is soft.   Musculoskeletal:      Right lower leg: No edema.      Left lower leg: No edema.   Skin:     General: Skin is warm.   Neurological:      Mental Status: He is alert. Mental status is at baseline.   Psychiatric:         Mood and Affect: Mood normal.         Behavior: Behavior normal.           Last Labs:  CBC -  Lab Results   Component Value Date    WBC 8.2 01/16/2019    HGB 13.5 01/16/2019    HCT 42.6 01/16/2019    MCV 88 01/16/2019     01/16/2019       CMP -  Lab Results   Component Value Date    CALCIUM 9.6 08/23/2023    PROT 7.0 08/23/2023    ALBUMIN 4.1 08/23/2023    AST 17 08/23/2023    ALT 17 08/23/2023    ALKPHOS 89 08/23/2023    BILITOT 0.4 08/23/2023       LIPID PANEL -   Lab Results   Component Value Date    CHOL 175 08/23/2023    TRIG 290 (H) 08/23/2023    HDL 36.2 (A) 08/23/2023    CHHDL 4.8 08/23/2023    LDLF 81 08/23/2023    VLDL 58 (H) 08/23/2023    NHDL 139 08/23/2023       RENAL FUNCTION PANEL -   Lab Results   Component  "Value Date    GLUCOSE 125 (H) 08/23/2023     08/23/2023    K 5.2 08/23/2023    CL 97 (L) 08/23/2023    CO2 33 (H) 08/23/2023    ANIONGAP 11 08/23/2023    BUN 27 (H) 08/23/2023    CREATININE 1.22 08/23/2023    GFRMALE 59 (A) 08/23/2023    CALCIUM 9.6 08/23/2023    ALBUMIN 4.1 08/23/2023        No results found for: \"BNP\", \"HGBA1C\"    Last Cardiology Tests:  ECG:  ECG 12 Lead 04/25/2024      Echo:  Transthoracic Echo (TTE) Complete 12/21/2023      Ejection Fractions:  EF   Date/Time Value Ref Range Status   12/21/2023 08:56 AM 45         Cath:  No results found for this or any previous visit from the past 1095 days.      Stress Test:  Nuclear Stress Test 04/25/2024      Cardiac Imaging:  No results found for this or any previous visit from the past 1095 days.          Assessment/Plan   1-chronic systolic heart failure/cardiomyopathy-he is on appropriate medical therapy and appears well compensated.  Unfortunately will not tolerate Aldactone as potassium levels are already quite high.  Target blood pressure less than 130/80 lower if possible.  He will follow-up with our [facility] for addition of hydralazine if blood pressure remains above goal.     Stress test was low risk and patient is relatively asymptomatic.  It is likely he has nonischemic cardiomyopathy.  I discussed the option of proceeding with cardiac cath for definitive diagnosis versus conservative medical therapy and risk-benefit alternatives of each were discussed with him.  He decided based on the conversation to proceed with conservative medical therapy at this time.  We decided that if symptoms were to change in future we will follow a more invasive treatment plan.  At this time he has good exercise tolerance and is NYHA class II at most.    2-peripheral vascular disease-with carotid artery disease and multiple cardiovascular risk factors patient remains on a high-dose potent statins and antiplatelet therapy recent history of TIA as well.  48-hour " monitor was negative I will arrange for a 14-day monitor to rule out A-fib.    3-uncontrolled hypertension-diastolic blood pressure remains elevated.  Arrange follow-up every 3 months in our office to ensure blood pressure stays below 130/80 and for addition of additional antihypertensives as needed.  Please see above.    Medardo Armando MD

## 2024-06-21 LAB — BODY SURFACE AREA: 2.14 M2

## 2024-06-25 NOTE — TELEPHONE ENCOUNTER
6/25/24  0915  Informed Dr. Armando patient on metoprolol succinate 100 mg daily.      ----- Message from Medardo Armando MD sent at 6/21/2024  5:34 PM EDT -----  No afib was detected. Some NSVT, please ensure patient is on some beta blocker. Thank you

## 2024-08-28 ENCOUNTER — APPOINTMENT (OUTPATIENT)
Dept: CARDIOLOGY | Facility: CLINIC | Age: 84
End: 2024-08-28
Payer: MEDICARE

## 2024-08-28 NOTE — PROGRESS NOTES
Occupational Therapy                 Therapy Communication Note    Patient Name: David Ahuja  MRN: [id number]  Today's Date: 8/28/2024     Discipline: Occupational Therapy    Missed Visit Reason:      Missed Time: No Show    Comment:

## 2024-08-29 ENCOUNTER — APPOINTMENT (OUTPATIENT)
Dept: CARDIOLOGY | Facility: CLINIC | Age: 84
End: 2024-08-29
Payer: MEDICARE

## 2024-11-29 ENCOUNTER — APPOINTMENT (OUTPATIENT)
Dept: CARDIOLOGY | Facility: CLINIC | Age: 84
End: 2024-11-29
Payer: MEDICARE

## 2025-07-11 ENCOUNTER — EVALUATION (OUTPATIENT)
Dept: OCCUPATIONAL THERAPY | Facility: HOSPITAL | Age: 85
End: 2025-07-11
Payer: MEDICARE

## 2025-07-11 DIAGNOSIS — R29.898 HAND WEAKNESS: ICD-10-CM

## 2025-07-11 DIAGNOSIS — I69.30 UNSPECIFIED SEQUELAE OF CEREBRAL INFARCTION: ICD-10-CM

## 2025-07-11 DIAGNOSIS — R29.898 OTHER SYMPTOMS AND SIGNS INVOLVING THE MUSCULOSKELETAL SYSTEM: ICD-10-CM

## 2025-07-11 DIAGNOSIS — R27.8 ABNORMAL COORDINATION: Primary | ICD-10-CM

## 2025-07-11 PROCEDURE — 97110 THERAPEUTIC EXERCISES: CPT | Mod: GO | Performed by: OCCUPATIONAL THERAPIST

## 2025-07-11 PROCEDURE — 97165 OT EVAL LOW COMPLEX 30 MIN: CPT | Mod: GO | Performed by: OCCUPATIONAL THERAPIST

## 2025-07-11 ASSESSMENT — PATIENT HEALTH QUESTIONNAIRE - PHQ9
SUM OF ALL RESPONSES TO PHQ9 QUESTIONS 1 AND 2: 0
2. FEELING DOWN, DEPRESSED OR HOPELESS: NOT AT ALL
1. LITTLE INTEREST OR PLEASURE IN DOING THINGS: NOT AT ALL

## 2025-07-11 ASSESSMENT — ENCOUNTER SYMPTOMS
DEPRESSION: 0
OCCASIONAL FEELINGS OF UNSTEADINESS: 1
LOSS OF SENSATION IN FEET: 1

## 2025-07-11 ASSESSMENT — PAIN - FUNCTIONAL ASSESSMENT: PAIN_FUNCTIONAL_ASSESSMENT: 0-10

## 2025-07-11 NOTE — LETTER
July 11, 2025    Wade Henley MD  [address] [address]    Patient: David Ahuja   YOB: 1940   Date of Visit: 7/11/2025       Dear Wade Henley MD  [address] [address]    The attached plan of care is being sent to you because your patient’s medical reimbursement requires that you certify the plan of care. Your signature is required to allow uninterrupted insurance coverage.      You may indicate your approval by signing below and faxing this form back to us at Dept Fax: [phone].    Please call Dept: [phone] with any questions or concerns.    Thank you for this referral,        Mariama Menendez OT  [address] [id number] [phone]    Payer: Payor: [employer] / Plan: [facility] MEDICARE ADVANTAGE / Product Type: *No Product type* /                                                                         Date:     Dear Mariama Menendez OT,     Re: Mr. David Ahuja, MRN:[id number]    I certify that I have reviewed the attached plan of care and it is medically necessary for Mr. David Ahuja (1940) who is under my care.          ______________________________________                    _________________  Provider name and credentials                                           Date and time                                                                                           Plan of Care 7/11/25   Effective from: 7/11/2025  Effective to: 10/3/2025    Plan ID: [id number]            Participants as of Finalize on 7/11/2025    Name Type Comments Contact Info    Wade Henley MD PCP - General  [phone]    Mariama Menendez OT Occupational Therapist  [phone]       Last Plan Note     Author: Mariama Menendez OT Status: Sign when Signing Visit Last edited: 7/11/2025  9:00 AM       Occupational  Therapy    Evaluation/Treatment    Patient Name: David Ahuja  MRN: [id number]  : 1940  Today's Date: 2025     Time Calculation  Start Time: 910  Stop Time:   Time Calculation (min): 60 min  Visit Number: 1  Therapeutic Procedure Codes:  OT Evaluation Time Entry  Evaluation (Low) Time Entry: 45   OT Therapeutic Procedures Time Entry  Therapeutic Exercise Time Entry: 15                         Subjective   Current Problem:  1. Abnormal coordination        2. Other symptoms and signs involving the musculoskeletal system  Referral to Occupational Therapy      3. Unspecified sequelae of cerebral infarction  Referral to Occupational Therapy      4. Hand weakness          General:   OT Received On: 25  General  Reason for Referral: Weakness  Referred By: Dr. Henley  Pt reporting he has been to this facility before for OT services. Concerns regarding decreased strength and mobility in hand. Per chart review, pt had CVA on 10/31/2023. Pt was R side impaired, mostly in UE. Pt reporting physician recommended he return to OT to address continued treatment. Education provided to pt regarding expectations and goals of OT. Pt reporting his personal goals include improvement with writing, strength, and mobility/stiffness on MF/RF/SF.   Hand Dominance: R    Precautions:  STEADI Fall Risk Score (The score of 4 or more indicates an increased risk of falling): 4     I have reviewed patients medical history form.   Pain:  Pain Assessment  Pain Assessment: 0-10  0-10 (Numeric) Pain Score:  (Achy in toes)    Objective      Home Living:    Pt lives with spouse.   Prior Function:    IND  ADL:   Pt reporting IND with ADL tasks.  Retired.  Pt enjoys doing yard work.   Activity Tolerance:   Pt reporting he does not feel he is limited by activity tolerance  Vision:  Intact  Sensation:   Intact  Strength:   MMT:  R elbow flex 4+/5  R shoulder flex 4+/5  L elbow flex 4+/5  R shoulder flex 4+/5  Coordination:    Opposition  thumb/digits: impaired  Alternating movements: sup/pron impaired  Outcome Measures:   Quickdash Scores: 20.45%  Raw Score: 20    Therapy/Activity:   Onset: 10/31/23     7/11/2025   Exercises: Reps:   Digit extension with hand flat on tabletop 1x10               Activities:    Pink T-putty 1x10 composite flexion, isolated pinch           HEP provided on HEP provided to pt including theraputty and digit ROM. Pt instructed to complete 2x a day, 5 reps within pain free range. Handouts provided to pt.    Modalities:                    Manual:                    Functional review:     Completed on: 7/11/2025 OT evaluation      Measurements:  :  Average taken from best 3 measurements.   Trials Average   RUE 44, 38, 46 43   LUE 73, 74, 75 74   RUE is 58% of LUE    Nine Hole Peg Test:  RUE 55 seconds   LUE 33 seconds     Box and Blocks  Right 37   Left 44     Quickdash Scores: 20.45%    Digit Measurements:  WFL unless documented below   MCP PIP DIP   R Ring -8/84 +2/90 0/80   R Small -12/90 -24/92 -6/62     OP EDUCATION:  Education  Individual(s) Educated: Patient  Education Provided: Diagnosis & Precautions, Symptom management, Fall precautons, POC discussed and agreed upon, Risk and benefits of OT discussed with patient or other  Home Program: [facility], Handout issued  Risk and Benefits Discussed with Patient/Caregiver/Other: yes  Patient/Caregiver Demonstrated Understanding: yes  Plan of Care Discussed and Agreed Upon: yes  Patient Response to Education: Patient/Caregiver Verbalized Understanding of Information    Assessment:   Pt is a 83 y/o M who presents to this facility with performance deficits in fine motor and gross motor coordination, strength, and hand mobility limiting ability to complete ADL and IADL tasks. Pt  provided with HEP including theraputty and digit ROM. Handouts provided to pt. Pt demonstrated understanding. Pt participated in therapeutic exercises to address performance deficits. Pt with increased  difficulty completing digit extension with hand flat on table top, encouragement provided. MIN VC for form and pacing with theraputty exercises. Pt tolerated OT treatment.     OT Assessment Results: Decreased ADL status, Decreased upper extremity range of motion, Decreased upper extremity strength, Decreased IADLs, Decreased fine motor control, Decreased gross motor control  Plan:  Frequency: 1x a week  Duration: 12x weeks  Occupational therapy intervention plan to include education/instruction, manual therapy, neuromuscular re-education, orthotic fitting/training, self-care/home management, therapeutic exercises, therapeutic activities, and home program.      Goals:  Active       OT Goals       LTG - Patient will indicate/ demonstrate the ability to resume all preinjury ADLs and IADLs without significant limits secondary to decreased ROM, decreased strength and/or pain as indicated by Quickdash score of less than 10%.        Start:  07/11/25    Expected End:  10/03/25            Develop and issue HEP to help maximize ROM, strength and tolerance to help maximize return to all pre-onset activities.        Start:  07/11/25    Expected End:  10/03/25            Patient will demonstrate a progressive increase in ROM as appropriate with RSF to be within 5-10 degrees of LSF to help patient resume normal ADL and IADL function.        Start:  07/11/25    Expected End:  10/03/25            Pt will demonstrate increased FMC in R hand as indicated by increased 9HPT score 2-5 seconds as compared to L hand.'       Start:  07/11/25    Expected End:  10/03/25            Pt will demonstrate increased  strength as appropriate with the R  to be greater than or equal to 70% of the L  to help patient resume ADLs and IADLs.        Start:  07/11/25    Expected End:  10/03/25               OT Goals       Pt will demonstrate increased GMC in R hand as indicated by increased box and blocks score of 2-5 blocks as compared to L  hand.        Start:  25    Expected End:  10/03/25              Insurance Authorization Information  Date of Evaluation: 2025    Onset Date: 10/31/2023    Referring Physician: Wade Henley     Surgery in the Last 3 months:  no    CPT Codes: Therapeutic Exercise: 74860, Therapeutic Activity: 69481, Neuromuscular Re-Education: 47586, Manual Therapy: 70002, and Self-Care/Home Management Trainin    Diagnosis:   Problem List Items Addressed This Visit           ICD-10-CM    Hand weakness R29.898    Abnormal coordination - Primary R27.8    Other symptoms and signs involving the musculoskeletal system R29.898    Unspecified sequelae of cerebral infarction I69.30          Functional Outcome:   Quickdash Scores: 20    OT / PT Evaluation complexity:  low    Which of the following best describes the primary reason of therapy: Improving, restoring, or adapting functional mobility or skills    Visits Requested: 12    Date Range: 90 days    Select all conditions that apply: None of these apply     Mariama Menendez OT               Current Participants as of 2025    Name Type Comments Contact Info    Wade Henley MD PCP - General  [phone]    Signature pending    Mariama Menendez OT Occupational Therapist  [phone]    Signature pending

## 2025-07-11 NOTE — PROGRESS NOTES
Occupational Therapy    Evaluation/Treatment    Patient Name: David Ahuja  MRN: [id number]  : 1940  Today's Date: 2025     Time Calculation  Start Time: 910  Stop Time: 1010  Time Calculation (min): 60 min  Visit Number: 1  Therapeutic Procedure Codes:  OT Evaluation Time Entry  Evaluation (Low) Time Entry: 60                             Subjective   Current Problem:  1. Abnormal coordination  Follow Up In Occupational Therapy      2. Other symptoms and signs involving the musculoskeletal system  Referral to Occupational Therapy    Follow Up In Occupational Therapy      3. Unspecified sequelae of cerebral infarction  Referral to Occupational Therapy    Follow Up In Occupational Therapy      4. Hand weakness  Follow Up In Occupational Therapy        General:   OT Received On: 25  General  Reason for Referral: Weakness  Referred By: Dr. Henley  Pt reporting he has been to this facility before for OT services. Concerns regarding decreased strength and mobility in hand. Per chart review, pt had CVA on 10/31/2023. Pt was R side impaired, mostly in UE. Pt reporting physician recommended he return to OT to address continued treatment. Education provided to pt regarding expectations and goals of OT. Pt reporting his personal goals include improvement with writing, strength, and mobility/stiffness on MF/RF/SF.   Hand Dominance: R    Precautions:  STEADI Fall Risk Score (The score of 4 or more indicates an increased risk of falling): 4     I have reviewed patients medical history form.   Pain:  Pain Assessment  Pain Assessment: 0-10  0-10 (Numeric) Pain Score:  (Achy in toes)    Objective      Home Living:    Pt lives with spouse.   Prior Function:    IND  ADL:   Pt reporting IND with ADL tasks.  Retired.  Pt enjoys doing yard work.   Activity Tolerance:   Pt reporting he does not feel he is limited by activity tolerance  Vision:  Intact  Sensation:   Intact  Strength:   MMT:  R elbow flex 4+/5  R  shoulder flex 4+/5  L elbow flex 4+/5  R shoulder flex 4+/5  Coordination:    Opposition thumb/digits: impaired  Alternating movements: sup/pron impaired  Outcome Measures:   Quickdash Scores: 20.45%  Raw Score: 20    Therapy/Activity:   Onset: 10/31/23     7/11/2025   Exercises: Reps:   Digit extension with hand flat on tabletop 1x10               Activities:    Pink T-putty 1x10 composite flexion, isolated pinch           HEP provided on HEP provided to pt including theraputty and digit ROM. Pt instructed to complete 2x a day, 5 reps within pain free range. Handouts provided to pt.    Modalities:                    Manual:                    Functional review:     Completed on: 7/11/2025 OT evaluation      Measurements:  :  Average taken from best 3 measurements.   Trials Average   RUE 44, 38, 46 43   LUE 73, 74, 75 74   RUE is 58% of LUE    Nine Hole Peg Test:  RUE 55 seconds   LUE 33 seconds     Box and Blocks  Right 37   Left 44     Quickdash Scores: 20.45%    Digit Measurements:  WFL unless documented below   MCP PIP DIP   R Ring -8/84 +2/90 0/80   R Small -12/90 -24/92 -6/62     OP EDUCATION:  Education  Individual(s) Educated: Patient  Education Provided: Diagnosis & Precautions, Symptom management, Fall precautons, POC discussed and agreed upon, Risk and benefits of OT discussed with patient or other  Home Program: [facility], Handout issued  Risk and Benefits Discussed with Patient/Caregiver/Other: yes  Patient/Caregiver Demonstrated Understanding: yes  Plan of Care Discussed and Agreed Upon: yes  Patient Response to Education: Patient/Caregiver Verbalized Understanding of Information    Assessment:   Pt is a 83 y/o M who presents to this facility with performance deficits in fine motor and gross motor coordination, strength, and hand mobility limiting ability to complete ADL and IADL tasks. Pt  provided with HEP including theraputty and digit ROM. Handouts provided to pt. Pt demonstrated understanding. Pt  participated in therapeutic exercises to address performance deficits. Pt with increased difficulty completing digit extension with hand flat on table top, encouragement provided. MIN VC for form and pacing with theraputty exercises. Pt tolerated OT treatment.     OT Assessment Results: Decreased ADL status, Decreased upper extremity range of motion, Decreased upper extremity strength, Decreased IADLs, Decreased fine motor control, Decreased gross motor control  Plan:  Frequency: 1x a week  Duration: 12x weeks  Occupational therapy intervention plan to include education/instruction, manual therapy, neuromuscular re-education, orthotic fitting/training, self-care/home management, therapeutic exercises, therapeutic activities, and home program.      Goals:  Active       OT Goals       LTG - Patient will indicate/ demonstrate the ability to resume all preinjury ADLs and IADLs without significant limits secondary to decreased ROM, decreased strength and/or pain as indicated by Quickdash score of less than 10%.        Start:  07/11/25    Expected End:  10/03/25            Develop and issue HEP to help maximize ROM, strength and tolerance to help maximize return to all pre-onset activities.        Start:  07/11/25    Expected End:  10/03/25            Patient will demonstrate a progressive increase in ROM as appropriate with RSF to be within 5-10 degrees of LSF to help patient resume normal ADL and IADL function.        Start:  07/11/25    Expected End:  10/03/25            Pt will demonstrate increased FMC in R hand as indicated by increased 9HPT score 2-5 seconds as compared to L hand.'       Start:  07/11/25    Expected End:  10/03/25            Pt will demonstrate increased  strength as appropriate with the R  to be greater than or equal to 70% of the L  to help patient resume ADLs and IADLs.        Start:  07/11/25    Expected End:  10/03/25               OT Goals       Pt will demonstrate increased GMC  in R hand as indicated by increased box and blocks score of 2-5 blocks as compared to L hand.        Start:  25    Expected End:  10/03/25              Insurance Authorization Information  Date of Evaluation: 2025    Onset Date: 10/31/2023    Referring Physician: Wade Henley     Surgery in the Last 3 months:  no    CPT Codes: Therapeutic Exercise: 55120, Therapeutic Activity: 52957, Neuromuscular Re-Education: 40765, Manual Therapy: 39958, and Self-Care/Home Management Trainin    Diagnosis:   Problem List Items Addressed This Visit           ICD-10-CM    Hand weakness R29.898    Relevant Orders    Follow Up In Occupational Therapy    Abnormal coordination - Primary R27.8    Relevant Orders    Follow Up In Occupational Therapy    Other symptoms and signs involving the musculoskeletal system R29.898    Relevant Orders    Follow Up In Occupational Therapy    Unspecified sequelae of cerebral infarction I69.30    Relevant Orders    Follow Up In Occupational Therapy          Functional Outcome:   Quickdash Scores: 20    OT / PT Evaluation complexity:  low    Which of the following best describes the primary reason of therapy: Improving, restoring, or adapting functional mobility or skills    Visits Requested: 12    Date Range: 90 days    Select all conditions that apply: None of these apply     Mariama Menendez, OT